# Patient Record
Sex: FEMALE | Race: WHITE | NOT HISPANIC OR LATINO
[De-identification: names, ages, dates, MRNs, and addresses within clinical notes are randomized per-mention and may not be internally consistent; named-entity substitution may affect disease eponyms.]

---

## 2019-12-11 PROBLEM — Z00.00 ENCOUNTER FOR PREVENTIVE HEALTH EXAMINATION: Status: ACTIVE | Noted: 2019-12-11

## 2020-01-21 ENCOUNTER — APPOINTMENT (OUTPATIENT)
Dept: ORTHOPEDIC SURGERY | Facility: CLINIC | Age: 75
End: 2020-01-21
Payer: MEDICARE

## 2020-01-21 VITALS
BODY MASS INDEX: 39.56 KG/M2 | SYSTOLIC BLOOD PRESSURE: 131 MMHG | HEART RATE: 76 BPM | HEIGHT: 62 IN | DIASTOLIC BLOOD PRESSURE: 87 MMHG | WEIGHT: 215 LBS

## 2020-01-21 DIAGNOSIS — M17.12 UNILATERAL PRIMARY OSTEOARTHRITIS, LEFT KNEE: ICD-10-CM

## 2020-01-21 DIAGNOSIS — Z86.39 PERSONAL HISTORY OF OTHER ENDOCRINE, NUTRITIONAL AND METABOLIC DISEASE: ICD-10-CM

## 2020-01-21 DIAGNOSIS — Z78.9 OTHER SPECIFIED HEALTH STATUS: ICD-10-CM

## 2020-01-21 PROCEDURE — 72170 X-RAY EXAM OF PELVIS: CPT | Mod: 59

## 2020-01-21 PROCEDURE — 73562 X-RAY EXAM OF KNEE 3: CPT | Mod: LT

## 2020-01-21 PROCEDURE — 99202 OFFICE O/P NEW SF 15 MIN: CPT

## 2020-01-21 RX ORDER — IBUPROFEN 200 MG/1
TABLET, COATED ORAL
Refills: 0 | Status: ACTIVE | COMMUNITY

## 2020-02-04 ENCOUNTER — OUTPATIENT (OUTPATIENT)
Dept: OUTPATIENT SERVICES | Facility: HOSPITAL | Age: 75
LOS: 1 days | End: 2020-02-04
Payer: MEDICARE

## 2020-02-04 VITALS
SYSTOLIC BLOOD PRESSURE: 136 MMHG | RESPIRATION RATE: 15 BRPM | HEIGHT: 59 IN | HEART RATE: 89 BPM | DIASTOLIC BLOOD PRESSURE: 80 MMHG | TEMPERATURE: 98 F | OXYGEN SATURATION: 95 % | WEIGHT: 209.44 LBS

## 2020-02-04 DIAGNOSIS — M50.20 OTHER CERVICAL DISC DISPLACEMENT, UNSPECIFIED CERVICAL REGION: ICD-10-CM

## 2020-02-04 DIAGNOSIS — I89.0 LYMPHEDEMA, NOT ELSEWHERE CLASSIFIED: ICD-10-CM

## 2020-02-04 DIAGNOSIS — M17.12 UNILATERAL PRIMARY OSTEOARTHRITIS, LEFT KNEE: ICD-10-CM

## 2020-02-04 DIAGNOSIS — Z90.49 ACQUIRED ABSENCE OF OTHER SPECIFIED PARTS OF DIGESTIVE TRACT: Chronic | ICD-10-CM

## 2020-02-04 DIAGNOSIS — E66.9 OBESITY, UNSPECIFIED: ICD-10-CM

## 2020-02-04 DIAGNOSIS — Z90.710 ACQUIRED ABSENCE OF BOTH CERVIX AND UTERUS: Chronic | ICD-10-CM

## 2020-02-04 DIAGNOSIS — Z98.890 OTHER SPECIFIED POSTPROCEDURAL STATES: Chronic | ICD-10-CM

## 2020-02-04 DIAGNOSIS — M25.562 PAIN IN LEFT KNEE: ICD-10-CM

## 2020-02-04 DIAGNOSIS — Z01.818 ENCOUNTER FOR OTHER PREPROCEDURAL EXAMINATION: ICD-10-CM

## 2020-02-04 LAB
ALBUMIN SERPL ELPH-MCNC: 3.9 G/DL — SIGNIFICANT CHANGE UP (ref 3.3–5)
ALP SERPL-CCNC: 137 U/L — HIGH (ref 30–120)
ALT FLD-CCNC: 21 U/L DA — SIGNIFICANT CHANGE UP (ref 10–60)
ANION GAP SERPL CALC-SCNC: 11 MMOL/L — SIGNIFICANT CHANGE UP (ref 5–17)
APTT BLD: 32.1 SEC — SIGNIFICANT CHANGE UP (ref 28.5–37)
AST SERPL-CCNC: 18 U/L — SIGNIFICANT CHANGE UP (ref 10–40)
BILIRUB SERPL-MCNC: 0.4 MG/DL — SIGNIFICANT CHANGE UP (ref 0.2–1.2)
BUN SERPL-MCNC: 17 MG/DL — SIGNIFICANT CHANGE UP (ref 7–23)
CALCIUM SERPL-MCNC: 9.1 MG/DL — SIGNIFICANT CHANGE UP (ref 8.4–10.5)
CHLORIDE SERPL-SCNC: 104 MMOL/L — SIGNIFICANT CHANGE UP (ref 96–108)
CO2 SERPL-SCNC: 26 MMOL/L — SIGNIFICANT CHANGE UP (ref 22–31)
CREAT SERPL-MCNC: 0.76 MG/DL — SIGNIFICANT CHANGE UP (ref 0.5–1.3)
GLUCOSE SERPL-MCNC: 148 MG/DL — HIGH (ref 70–99)
HCT VFR BLD CALC: 43.9 % — SIGNIFICANT CHANGE UP (ref 34.5–45)
HGB BLD-MCNC: 14.1 G/DL — SIGNIFICANT CHANGE UP (ref 11.5–15.5)
INR BLD: 0.99 RATIO — SIGNIFICANT CHANGE UP (ref 0.88–1.16)
MCHC RBC-ENTMCNC: 27.1 PG — SIGNIFICANT CHANGE UP (ref 27–34)
MCHC RBC-ENTMCNC: 32.1 GM/DL — SIGNIFICANT CHANGE UP (ref 32–36)
MCV RBC AUTO: 84.4 FL — SIGNIFICANT CHANGE UP (ref 80–100)
NRBC # BLD: 0 /100 WBCS — SIGNIFICANT CHANGE UP (ref 0–0)
PLATELET # BLD AUTO: 319 K/UL — SIGNIFICANT CHANGE UP (ref 150–400)
POTASSIUM SERPL-MCNC: 3.8 MMOL/L — SIGNIFICANT CHANGE UP (ref 3.5–5.3)
POTASSIUM SERPL-SCNC: 3.8 MMOL/L — SIGNIFICANT CHANGE UP (ref 3.5–5.3)
PROT SERPL-MCNC: 8.5 G/DL — HIGH (ref 6–8.3)
PROTHROM AB SERPL-ACNC: 11 SEC — SIGNIFICANT CHANGE UP (ref 10–12.9)
RBC # BLD: 5.2 M/UL — SIGNIFICANT CHANGE UP (ref 3.8–5.2)
RBC # FLD: 12.8 % — SIGNIFICANT CHANGE UP (ref 10.3–14.5)
SODIUM SERPL-SCNC: 141 MMOL/L — SIGNIFICANT CHANGE UP (ref 135–145)
WBC # BLD: 8.74 K/UL — SIGNIFICANT CHANGE UP (ref 3.8–10.5)
WBC # FLD AUTO: 8.74 K/UL — SIGNIFICANT CHANGE UP (ref 3.8–10.5)

## 2020-02-04 PROCEDURE — G0463: CPT

## 2020-02-04 NOTE — H&P PST ADULT - NSICDXPROBLEM_GEN_ALL_CORE_FT
PROBLEM DIAGNOSES  Problem: Lymphedema  Assessment and Plan: to see vascular PMD for clearance    Problem: Obesity  Assessment and Plan: cardio and medical clearance    Problem: Bulging of cervical intervertebral disc  Assessment and Plan: anesthesia to be aware    Problem: Left knee pain  Assessment and Plan: left total knee replacement; preop instructions given; to have medical, cardiac and vascular clearance

## 2020-02-04 NOTE — H&P PST ADULT - NSICDXPASTMEDICALHX_GEN_ALL_CORE_FT
PAST MEDICAL HISTORY:  Bilateral knee pain osteoarthritis    Bulging of cervical intervertebral disc     GERD (gastroesophageal reflux disease)     Hyperlipidemia     Low back pain     Lymphedema of leg legs    Obesity

## 2020-02-04 NOTE — H&P PST ADULT - HISTORY OF PRESENT ILLNESS
this is a 73 y/o female who has had left knee pain and trouble walking for 1.5 yrs;  tests show OA and bone on bone; to have left total knee replacement; takes advil for pain

## 2020-02-04 NOTE — H&P PST ADULT - NEGATIVE IMMUNOLOGICAL SYMPTOMS
How Severe Is This Condition?: mild
Additional History: Patient states that she has been having some breakouts on the jawline area. She is not currently using anything to treat it.
no recurring infections/no recurring pneumonia/no persistent infections

## 2020-02-04 NOTE — H&P PST ADULT - NSANTHOSAYNRD_GEN_A_CORE
No. LM screening performed.  STOP BANG Legend: 0-2 = LOW Risk; 3-4 = INTERMEDIATE Risk; 5-8 = HIGH Risk

## 2020-02-05 LAB
HBA1C BLD-MCNC: 6.3 % — HIGH (ref 4–5.6)
MRSA PCR RESULT.: SIGNIFICANT CHANGE UP
S AUREUS DNA NOSE QL NAA+PROBE: SIGNIFICANT CHANGE UP

## 2020-02-13 PROBLEM — E66.9 OBESITY, UNSPECIFIED: Chronic | Status: ACTIVE | Noted: 2020-02-04

## 2020-02-13 PROBLEM — M54.5 LOW BACK PAIN: Chronic | Status: ACTIVE | Noted: 2020-02-04

## 2020-02-13 PROBLEM — M50.20 OTHER CERVICAL DISC DISPLACEMENT, UNSPECIFIED CERVICAL REGION: Chronic | Status: ACTIVE | Noted: 2020-02-04

## 2020-02-13 PROBLEM — E78.5 HYPERLIPIDEMIA, UNSPECIFIED: Chronic | Status: ACTIVE | Noted: 2020-02-04

## 2020-02-13 PROBLEM — I89.0 LYMPHEDEMA, NOT ELSEWHERE CLASSIFIED: Chronic | Status: ACTIVE | Noted: 2020-02-04

## 2020-02-13 PROBLEM — K21.9 GASTRO-ESOPHAGEAL REFLUX DISEASE WITHOUT ESOPHAGITIS: Chronic | Status: ACTIVE | Noted: 2020-02-04

## 2020-02-13 PROBLEM — M25.561 PAIN IN RIGHT KNEE: Chronic | Status: ACTIVE | Noted: 2020-02-04

## 2020-02-19 ENCOUNTER — APPOINTMENT (OUTPATIENT)
Dept: ORTHOPEDIC SURGERY | Facility: HOSPITAL | Age: 75
End: 2020-02-19

## 2020-02-19 ENCOUNTER — INPATIENT (INPATIENT)
Facility: HOSPITAL | Age: 75
LOS: 4 days | Discharge: HOME | DRG: 470 | End: 2020-02-24
Attending: ORTHOPAEDIC SURGERY | Admitting: ORTHOPAEDIC SURGERY
Payer: MEDICARE

## 2020-02-19 ENCOUNTER — RESULT REVIEW (OUTPATIENT)
Age: 75
End: 2020-02-19

## 2020-02-19 ENCOUNTER — TRANSCRIPTION ENCOUNTER (OUTPATIENT)
Age: 75
End: 2020-02-19

## 2020-02-19 VITALS
WEIGHT: 210.76 LBS | HEART RATE: 84 BPM | SYSTOLIC BLOOD PRESSURE: 146 MMHG | HEIGHT: 62 IN | RESPIRATION RATE: 12 BRPM | TEMPERATURE: 98 F | DIASTOLIC BLOOD PRESSURE: 90 MMHG | OXYGEN SATURATION: 98 %

## 2020-02-19 DIAGNOSIS — Z90.710 ACQUIRED ABSENCE OF BOTH CERVIX AND UTERUS: Chronic | ICD-10-CM

## 2020-02-19 DIAGNOSIS — M17.12 UNILATERAL PRIMARY OSTEOARTHRITIS, LEFT KNEE: ICD-10-CM

## 2020-02-19 DIAGNOSIS — Z98.890 OTHER SPECIFIED POSTPROCEDURAL STATES: Chronic | ICD-10-CM

## 2020-02-19 DIAGNOSIS — Z90.49 ACQUIRED ABSENCE OF OTHER SPECIFIED PARTS OF DIGESTIVE TRACT: Chronic | ICD-10-CM

## 2020-02-19 LAB
ANION GAP SERPL CALC-SCNC: 9 MMOL/L — SIGNIFICANT CHANGE UP (ref 5–17)
BUN SERPL-MCNC: 15 MG/DL — SIGNIFICANT CHANGE UP (ref 7–23)
CALCIUM SERPL-MCNC: 8.9 MG/DL — SIGNIFICANT CHANGE UP (ref 8.4–10.5)
CHLORIDE SERPL-SCNC: 102 MMOL/L — SIGNIFICANT CHANGE UP (ref 96–108)
CO2 SERPL-SCNC: 26 MMOL/L — SIGNIFICANT CHANGE UP (ref 22–31)
CREAT SERPL-MCNC: 0.97 MG/DL — SIGNIFICANT CHANGE UP (ref 0.5–1.3)
GLUCOSE SERPL-MCNC: 292 MG/DL — HIGH (ref 70–99)
HCT VFR BLD CALC: 39.2 % — SIGNIFICANT CHANGE UP (ref 34.5–45)
HGB BLD-MCNC: 12.9 G/DL — SIGNIFICANT CHANGE UP (ref 11.5–15.5)
POTASSIUM SERPL-MCNC: 4.3 MMOL/L — SIGNIFICANT CHANGE UP (ref 3.5–5.3)
POTASSIUM SERPL-SCNC: 4.3 MMOL/L — SIGNIFICANT CHANGE UP (ref 3.5–5.3)
SODIUM SERPL-SCNC: 137 MMOL/L — SIGNIFICANT CHANGE UP (ref 135–145)

## 2020-02-19 PROCEDURE — 27447 TOTAL KNEE ARTHROPLASTY: CPT | Mod: 82,LT

## 2020-02-19 PROCEDURE — 88311 DECALCIFY TISSUE: CPT | Mod: 26

## 2020-02-19 PROCEDURE — 73562 X-RAY EXAM OF KNEE 3: CPT | Mod: 26,LT

## 2020-02-19 PROCEDURE — 99222 1ST HOSP IP/OBS MODERATE 55: CPT

## 2020-02-19 PROCEDURE — 27447 TOTAL KNEE ARTHROPLASTY: CPT | Mod: LT

## 2020-02-19 PROCEDURE — 88305 TISSUE EXAM BY PATHOLOGIST: CPT | Mod: 26

## 2020-02-19 RX ORDER — OXYCODONE HYDROCHLORIDE 5 MG/1
5 TABLET ORAL ONCE
Refills: 0 | Status: DISCONTINUED | OUTPATIENT
Start: 2020-02-19 | End: 2020-02-19

## 2020-02-19 RX ORDER — ASPIRIN/CALCIUM CARB/MAGNESIUM 324 MG
1 TABLET ORAL
Qty: 82 | Refills: 0
Start: 2020-02-19 | End: 2020-03-30

## 2020-02-19 RX ORDER — SENNA PLUS 8.6 MG/1
2 TABLET ORAL
Qty: 0 | Refills: 0 | DISCHARGE
Start: 2020-02-19

## 2020-02-19 RX ORDER — CEFAZOLIN SODIUM 1 G
2000 VIAL (EA) INJECTION EVERY 8 HOURS
Refills: 0 | Status: COMPLETED | OUTPATIENT
Start: 2020-02-19 | End: 2020-02-19

## 2020-02-19 RX ORDER — OXYCODONE HYDROCHLORIDE 5 MG/1
5 TABLET ORAL
Refills: 0 | Status: DISCONTINUED | OUTPATIENT
Start: 2020-02-19 | End: 2020-02-20

## 2020-02-19 RX ORDER — MAGNESIUM HYDROXIDE 400 MG/1
30 TABLET, CHEWABLE ORAL DAILY
Refills: 0 | Status: DISCONTINUED | OUTPATIENT
Start: 2020-02-19 | End: 2020-02-24

## 2020-02-19 RX ORDER — HYDROMORPHONE HYDROCHLORIDE 2 MG/ML
0.5 INJECTION INTRAMUSCULAR; INTRAVENOUS; SUBCUTANEOUS
Refills: 0 | Status: DISCONTINUED | OUTPATIENT
Start: 2020-02-19 | End: 2020-02-19

## 2020-02-19 RX ORDER — OXYCODONE HYDROCHLORIDE 5 MG/1
10 TABLET ORAL
Refills: 0 | Status: DISCONTINUED | OUTPATIENT
Start: 2020-02-19 | End: 2020-02-20

## 2020-02-19 RX ORDER — CHLORHEXIDINE GLUCONATE 213 G/1000ML
1 SOLUTION TOPICAL ONCE
Refills: 0 | Status: COMPLETED | OUTPATIENT
Start: 2020-02-19 | End: 2020-02-19

## 2020-02-19 RX ORDER — CEFAZOLIN SODIUM 1 G
2000 VIAL (EA) INJECTION ONCE
Refills: 0 | Status: COMPLETED | OUTPATIENT
Start: 2020-02-19 | End: 2020-02-19

## 2020-02-19 RX ORDER — ACETAMINOPHEN 500 MG
2 TABLET ORAL
Qty: 0 | Refills: 0 | DISCHARGE
Start: 2020-02-19 | End: 2020-03-04

## 2020-02-19 RX ORDER — POLYETHYLENE GLYCOL 3350 17 G/17G
17 POWDER, FOR SOLUTION ORAL
Qty: 0 | Refills: 0 | DISCHARGE
Start: 2020-02-19

## 2020-02-19 RX ORDER — SENNA PLUS 8.6 MG/1
2 TABLET ORAL AT BEDTIME
Refills: 0 | Status: DISCONTINUED | OUTPATIENT
Start: 2020-02-19 | End: 2020-02-24

## 2020-02-19 RX ORDER — HYDROMORPHONE HYDROCHLORIDE 2 MG/ML
0.5 INJECTION INTRAMUSCULAR; INTRAVENOUS; SUBCUTANEOUS
Refills: 0 | Status: DISCONTINUED | OUTPATIENT
Start: 2020-02-19 | End: 2020-02-24

## 2020-02-19 RX ORDER — ASPIRIN/CALCIUM CARB/MAGNESIUM 324 MG
81 TABLET ORAL EVERY 12 HOURS
Refills: 0 | Status: DISCONTINUED | OUTPATIENT
Start: 2020-02-20 | End: 2020-02-24

## 2020-02-19 RX ORDER — ACETAMINOPHEN 500 MG
1000 TABLET ORAL EVERY 8 HOURS
Refills: 0 | Status: DISCONTINUED | OUTPATIENT
Start: 2020-02-20 | End: 2020-02-24

## 2020-02-19 RX ORDER — PANTOPRAZOLE SODIUM 20 MG/1
1 TABLET, DELAYED RELEASE ORAL
Qty: 30 | Refills: 1
Start: 2020-02-19 | End: 2020-04-18

## 2020-02-19 RX ORDER — ACETAMINOPHEN 500 MG
1000 TABLET ORAL ONCE
Refills: 0 | Status: COMPLETED | OUTPATIENT
Start: 2020-02-19 | End: 2020-02-19

## 2020-02-19 RX ORDER — ONDANSETRON 8 MG/1
4 TABLET, FILM COATED ORAL EVERY 6 HOURS
Refills: 0 | Status: DISCONTINUED | OUTPATIENT
Start: 2020-02-19 | End: 2020-02-24

## 2020-02-19 RX ORDER — INFLUENZA VIRUS VACCINE 15; 15; 15; 15 UG/.5ML; UG/.5ML; UG/.5ML; UG/.5ML
0.5 SUSPENSION INTRAMUSCULAR ONCE
Refills: 0 | Status: COMPLETED | OUTPATIENT
Start: 2020-02-19 | End: 2020-02-19

## 2020-02-19 RX ORDER — PANTOPRAZOLE SODIUM 20 MG/1
40 TABLET, DELAYED RELEASE ORAL
Refills: 0 | Status: DISCONTINUED | OUTPATIENT
Start: 2020-02-20 | End: 2020-02-24

## 2020-02-19 RX ORDER — POLYETHYLENE GLYCOL 3350 17 G/17G
17 POWDER, FOR SOLUTION ORAL AT BEDTIME
Refills: 0 | Status: DISCONTINUED | OUTPATIENT
Start: 2020-02-19 | End: 2020-02-24

## 2020-02-19 RX ORDER — TRANEXAMIC ACID 100 MG/ML
1000 INJECTION, SOLUTION INTRAVENOUS ONCE
Refills: 0 | Status: COMPLETED | OUTPATIENT
Start: 2020-02-19 | End: 2020-02-19

## 2020-02-19 RX ORDER — IBUPROFEN 200 MG
1 TABLET ORAL
Qty: 0 | Refills: 0 | DISCHARGE

## 2020-02-19 RX ORDER — ACETAMINOPHEN 500 MG
1000 TABLET ORAL EVERY 6 HOURS
Refills: 0 | Status: COMPLETED | OUTPATIENT
Start: 2020-02-19 | End: 2020-02-20

## 2020-02-19 RX ORDER — CELECOXIB 200 MG/1
1 CAPSULE ORAL
Qty: 60 | Refills: 0
Start: 2020-02-19 | End: 2020-03-19

## 2020-02-19 RX ORDER — ONDANSETRON 8 MG/1
4 TABLET, FILM COATED ORAL ONCE
Refills: 0 | Status: DISCONTINUED | OUTPATIENT
Start: 2020-02-19 | End: 2020-02-19

## 2020-02-19 RX ORDER — CELECOXIB 200 MG/1
200 CAPSULE ORAL EVERY 12 HOURS
Refills: 0 | Status: DISCONTINUED | OUTPATIENT
Start: 2020-02-19 | End: 2020-02-24

## 2020-02-19 RX ORDER — SODIUM CHLORIDE 9 MG/ML
1000 INJECTION, SOLUTION INTRAVENOUS
Refills: 0 | Status: DISCONTINUED | OUTPATIENT
Start: 2020-02-19 | End: 2020-02-19

## 2020-02-19 RX ORDER — SODIUM CHLORIDE 9 MG/ML
1000 INJECTION, SOLUTION INTRAVENOUS
Refills: 0 | Status: DISCONTINUED | OUTPATIENT
Start: 2020-02-19 | End: 2020-02-21

## 2020-02-19 RX ORDER — HYDROMORPHONE HYDROCHLORIDE 2 MG/ML
0.5 INJECTION INTRAMUSCULAR; INTRAVENOUS; SUBCUTANEOUS ONCE
Refills: 0 | Status: DISCONTINUED | OUTPATIENT
Start: 2020-02-19 | End: 2020-02-19

## 2020-02-19 RX ADMIN — Medication 100 MILLIGRAM(S): at 22:59

## 2020-02-19 RX ADMIN — SODIUM CHLORIDE 100 MILLILITER(S): 9 INJECTION, SOLUTION INTRAVENOUS at 20:29

## 2020-02-19 RX ADMIN — HYDROMORPHONE HYDROCHLORIDE 0.5 MILLIGRAM(S): 2 INJECTION INTRAMUSCULAR; INTRAVENOUS; SUBCUTANEOUS at 23:51

## 2020-02-19 RX ADMIN — SODIUM CHLORIDE 50 MILLILITER(S): 9 INJECTION, SOLUTION INTRAVENOUS at 10:26

## 2020-02-19 RX ADMIN — HYDROMORPHONE HYDROCHLORIDE 0.5 MILLIGRAM(S): 2 INJECTION INTRAMUSCULAR; INTRAVENOUS; SUBCUTANEOUS at 23:14

## 2020-02-19 RX ADMIN — OXYCODONE HYDROCHLORIDE 5 MILLIGRAM(S): 5 TABLET ORAL at 14:09

## 2020-02-19 RX ADMIN — Medication 400 MILLIGRAM(S): at 20:30

## 2020-02-19 RX ADMIN — Medication 1000 MILLIGRAM(S): at 14:30

## 2020-02-19 RX ADMIN — CELECOXIB 200 MILLIGRAM(S): 200 CAPSULE ORAL at 21:30

## 2020-02-19 RX ADMIN — HYDROMORPHONE HYDROCHLORIDE 0.5 MILLIGRAM(S): 2 INJECTION INTRAMUSCULAR; INTRAVENOUS; SUBCUTANEOUS at 22:59

## 2020-02-19 RX ADMIN — HYDROMORPHONE HYDROCHLORIDE 0.5 MILLIGRAM(S): 2 INJECTION INTRAMUSCULAR; INTRAVENOUS; SUBCUTANEOUS at 12:40

## 2020-02-19 RX ADMIN — SODIUM CHLORIDE 100 MILLILITER(S): 9 INJECTION, SOLUTION INTRAVENOUS at 12:13

## 2020-02-19 RX ADMIN — OXYCODONE HYDROCHLORIDE 5 MILLIGRAM(S): 5 TABLET ORAL at 15:00

## 2020-02-19 RX ADMIN — HYDROMORPHONE HYDROCHLORIDE 0.5 MILLIGRAM(S): 2 INJECTION INTRAMUSCULAR; INTRAVENOUS; SUBCUTANEOUS at 12:13

## 2020-02-19 RX ADMIN — CELECOXIB 200 MILLIGRAM(S): 200 CAPSULE ORAL at 21:11

## 2020-02-19 RX ADMIN — Medication 400 MILLIGRAM(S): at 14:03

## 2020-02-19 RX ADMIN — Medication 100 MILLIGRAM(S): at 15:39

## 2020-02-19 RX ADMIN — Medication 1000 MILLIGRAM(S): at 21:00

## 2020-02-19 NOTE — CONSULT NOTE ADULT - ASSESSMENT
Pt is a 75 yo F presenting to [facility] ED for elective L TKR. PMH Osteoarthritis, Pre-Diabetes.     Osteoarthritis: s/p L TKA POD #0  -pain control as ordered  -Caprini Score 9 points.   -ASA as ordered (previously tolerated as per patient)  -Post Op X Ray shows Postoperative AP and lateral views show a knee replacement with good alignment.    Pre-Diabetes: A1c 6.3%.   -monitor blood glucose.     DVT ppx: ASA as ordered.

## 2020-02-19 NOTE — PHYSICAL THERAPY INITIAL EVALUATION ADULT - GENERAL OBSERVATIONS, REHAB EVAL
Pt received supine in bed. All lines intact. Pt agreeable to physical therapy. + bandage left LE C/D/I + mallory drain + IV +SCD + supplemental 02

## 2020-02-19 NOTE — PROGRESS NOTE ADULT - SUBJECTIVE AND OBJECTIVE BOX
Ortho Post Op Check    Procedure: left TKA  Surgeon: Andrei    Pt c/o a lot of pain.  Just got a dose of IV dialudid.  Denies CP, SOB, N/V, numbness/tingling .  Has voided, eating.  Pain Rx:  acetaminophen  IVPB .. 1000 milliGRAM(s) IV Intermittent every 6 hours  celecoxib 200 milliGRAM(s) Oral every 12 hours  HYDROmorphone  Injectable 0.5 milliGRAM(s) IV Push every 3 hours PRN  ondansetron Injectable 4 milliGRAM(s) IV Push every 6 hours PRN  oxyCODONE    IR 5 milliGRAM(s) Oral every 3 hours PRN  oxyCODONE    IR 10 milliGRAM(s) Oral every 3 hours PRN      General Exam:  Vital Signs Last 24 Hrs  T(C): 36.1 (02-19-20 @ 11:44), Max: 36.4 (02-19-20 @ 09:59)  T(F): 97 (02-19-20 @ 11:44), Max: 97.5 (02-19-20 @ 09:59)  HR: 66 (02-19-20 @ 11:44) (65 - 73)  BP: 112/61 (02-19-20 @ 11:44) (91/54 - 112/61)  BP(mean): --  RR: 16 (02-19-20 @ 11:44) (12 - 62)  SpO2: 99% (02-19-20 @ 11:44) (95% - 99%)    General: Pt Alert and oriented, NAD,   Heart: RRR no murmur  Lungs:clear  Abdomen: BS+.    Ext:  Left Knee Dressing clean, dry, & intact.  Johnson under ace.  Walked 10' w/ PT  Exercises reviewed and performed by pt  Neuro/Vasc: Feet toes warm, pink. DP = 2+ No calf tenderness bilat.. Has sensation over feet & toes bilat. Full AROM bilat feet & toes. EHL = 5/5 + lymhedema left foot  VTEP: On Venodynes Bilat + BID Ecotrin       A/P: 74yFemale POD#0 s/p left TKA  - Stable  - Pain Control  - DVT ppx: ecotrin  - Post op abx:ancef  - Weight bearing status: wbat  -d/c plan-- home w/ services

## 2020-02-19 NOTE — DISCHARGE NOTE PROVIDER - NSDCFUSCHEDAPPT_GEN_ALL_CORE_FT
IZABELA CLARK ; 02/19/2020 ; [facility] [address] [facility] [address]  IZABELA CLARK ; 03/09/2020 ; NP OrthoSurg [address] [facility]  IZABELA CLARK ; 04/21/2020 ; [facility] [address] [facility] IZABELA CLARK ; 03/09/2020 ; [facility]  IZABELA CLARK ; 04/21/2020 ; [facility] [address] [place] [address] IZABELA CLARK ; 03/09/2020 ; hospitals [facility]  IZABELA CLARK ; 04/21/2020 ; [facility] [address] [place] [address] IZABELA CLARK ; 03/09/2020 ; [facility]  IZABELA CLARK ; 04/21/2020 ; [facility] [address] [place] [address]

## 2020-02-19 NOTE — DISCHARGE NOTE PROVIDER - NSDCACTIVITY_GEN_ALL_CORE
Showering allowed/Walking - Outdoors allowed/No heavy lifting/straining/Do not drive or operate machinery/Walking - Indoors allowed/Stairs allowed

## 2020-02-19 NOTE — DISCHARGE NOTE NURSING/CASE MANAGEMENT/SOCIAL WORK - NSDCCRNAME_GEN_ALL_CORE_FT
[facility] ([facility])  [address] [place] [address]

## 2020-02-19 NOTE — DISCHARGE NOTE NURSING/CASE MANAGEMENT/SOCIAL WORK - NSSCTYPOFSERV_GEN_ALL_CORE
VN/PT services Registered Nurse to visit the day after hospital discharge; Physical Therapist to follow. Please contact the home care agency at the above phone number if you have not heard from them by 12 noon on the day after your hospital discharge.

## 2020-02-19 NOTE — OCCUPATIONAL THERAPY INITIAL EVALUATION ADULT - ADDITIONAL COMMENTS
Lives with her children who work during the day.  2 steps to enter no handrail . No steps inside. Stall shower. Has a RW, cane and SC

## 2020-02-19 NOTE — DISCHARGE NOTE PROVIDER - NSDCCPCAREPLAN_GEN_ALL_CORE_FT
PRINCIPAL DISCHARGE DIAGNOSIS  Diagnosis: Left knee DJD  Assessment and Plan of Treatment: Your new total knee requires proper care.  Your care provider is your best resource for care information.  Please follow these basic guidelines.  Use pain medication if needed as prescribed.  Ice packs are a helpful addition to your comfort.  Applying a  waterproof ice pack over a cloth or thin towel to the site for 30 minutes per hour may provide benefit by reducing swelling and discomfort.  Your Physical Therapy/Occupational Therapy may include ambulation, transfers, stairs, ADL's (activities of daily living), range of motion exercises, and isometrics.  Your participation is vital to your recovery.  -Your Activity  • Weight Bearing as tolerated with rolling walker.  • Take short, frequent walks increasing the distance that you walk each day as tolerated.  • Change your position every hour to decrease pain and stiffness.  • Continue the exercises taught to you by your physical therapist.  • No driving until cleared by the doctor.  • No tub baths, hot tubs, or swimming pools until instructed by your doctor.  • Do not squat down on the floor.  • Do not kneel or twist your knee.  Keep left knee incision dry and intact (HOPE dressing) for up to 7 days. May remove earlier if battery alarms and wrap knee wound with dry sterile dressing if needed.  You have sutures (stiches) and may shower if HOPE intact or if no drainage noted from sutured incision, allowing water to rinse the incision and pat it dry afterward with a clean towel.  If you have further questions or problems call your doctor's office.

## 2020-02-19 NOTE — BRIEF OPERATIVE NOTE - COMMENTS
Tourniquet at 250mm Hg  Closed with nylon and HOPE Tourniquet at 250mm Hg for 65 minutes  Closed with nylon and covered with HOPE

## 2020-02-19 NOTE — DISCHARGE NOTE PROVIDER - INSTRUCTIONS
Regular diet.  For Constipation :   • Increase your daily water intake.   • Try adding fiber to your diet by eating fruits, vegetables and foods that are rich in grains.  • If you do experience constipation, you may take an over-the-counter stool softener/laxative such as Colace, Senokot , Milk of Magnesia or Miralax.

## 2020-02-19 NOTE — DISCHARGE NOTE NURSING/CASE MANAGEMENT/SOCIAL WORK - NSSCNAMETXT_GEN_ALL_CORE
[facility] -Nurse to visit the day after hospital discharge; physical therapist to follow. Please contact the home care agency if you have not heard from them by 12 noon on the day after your hospital discharge. [facility] (formerly [facility])   [address] [place] [address]

## 2020-02-19 NOTE — CONSULT NOTE ADULT - SUBJECTIVE AND OBJECTIVE BOX
Pt is a 75 yo F presenting to [facility] ED for elective L TKR. PMH Osteoarthritis, borderline DM as reported by patient.   Patient seen and examined postoperatively. She complains of 10/10 L knee pain, sharp i nature, worse with movement and constant, no radiation elsewhere. Has no other complaints. Denies headaches, nausea, vomiting, chest pain, SOB, palpitations, abdominal pain, constipation, diarrhea, melena, hematochezia, dysuria.     Allergies: Erythromycin and Lipitor (intolerance), Anacin intolerance  Past surgical hx: cholecystectomy (in 1970's)  Social hx: Denies use of etoh, tobacco and drug use.   Family hx: denies family hx of CVA in parents    T(C): 36.1 (02-19-20 @ 11:44), Max: 36.7 (02-19-20 @ 06:51)  HR: 66 (02-19-20 @ 11:44) (65 - 84)  BP: 112/61 (02-19-20 @ 11:44) (91/54 - 166/90)  RR: 16 (02-19-20 @ 11:44) (12 - 62)  SpO2: 99% (02-19-20 @ 11:44) (95% - 100%)  Wt(kg): --    Physical Exam:   GENERAL: well-groomed, well-developed, NAD  HEENT: head NC/AT; EOM intact, conjunctiva & sclera clear; hearing grossly intact, moist mucous membranes  NECK: supple, no JVD  RESPIRATORY: CTA B/L, no wheezing, rales, rhonchi or rubs  CARDIOVASCULAR: S1&S2, RRR, no murmurs or gallops  ABDOMEN: soft, non-tender, non-distended, + Bowel sounds x4 quadrants, no guarding, rebound or rigidity  MUSCULOSKELETAL:  no clubbing, cyanosis or edema of all 4 extremities  LYMPH: no cervical lymphadenopathy  VASCULAR: Radial pulses 2+ bilaterally, no varicose veins   SKIN: warm and dry, color normal  NEUROLOGIC: AA&O X3, CN2-12 intact w/ no focal deficits, no sensory loss, motor Strength 5/5 in UE & LE B/L  Psych: Normal mood and affect, normal behavior

## 2020-02-19 NOTE — DISCHARGE NOTE PROVIDER - HOSPITAL COURSE
This 73yo female patient was admitted to [facility] on 2/19/20 with a history of severe degenerative joint disease of the left knee and for elective total joint replacement.  Patient had appropriate preop medical evaluation and testing.    Patient received antibiotics according to SCIP guidelines for infection prevention.  The patient underwent an uncomplicated left total knee replacement by Dr. Ruddy Forbes on 2/19.   Aspirin was given for DVT prophylaxis for Caprini of 9.  Anesthesia, Medical Hospitalist, Physical Therapy and Occupational Therapy were consulted.  Patient is stable for discharge home with a good prognosis on ***.  Appropriate discharge instructions and medications are provided in this document. This 73yo female patient was admitted to [facility] on 2/19/20 with a history of severe degenerative joint disease of the left knee and for elective total joint replacement.  Patient had appropriate preop medical evaluation and testing.    Patient received antibiotics according to SCIP guidelines for infection prevention.  The patient underwent an uncomplicated left total knee replacement by Dr. Ruddy Forbes on 2/19.   Aspirin was given for DVT prophylaxis for Caprini of 9.  Anesthesia, Medical Hospitalist, Physical Therapy and Occupational Therapy were consulted.  Patient is stable for discharge home with a good prognosis on2/24/20  Appropriate discharge instructions and medications are provided in this document. This 73yo female patient was admitted to [facility] on 2/19/20 with a history of severe degenerative joint disease of the left knee and for elective total joint replacement.  Patient had appropriate preop medical evaluation and testing.    Patient received antibiotics according to SCIP guidelines for infection prevention.  The patient underwent an uncomplicated left total knee replacement by Dr. Ruddy Forbes on 2/19.   Aspirin was given for DVT prophylaxis for Caprini of 9.  Anesthesia, Medical Hospitalist, Physical Therapy and Occupational Therapy were consulted.  Patient is stable for discharge home with a good prognosis on2/24/20  Appropriate discharge instructions and medications are provided in this document.        Addendum Dr. Blancas. Patient feeling well. No complaints. Pain controlled. Voiding/having BM. Ambulating well.        T(C): 36.5 (02-20-20 @ 11:41), Max: 37.3 (02-19-20 @ 23:49)    HR: 76 (02-20-20 @ 11:41) (61 - 79)    BP: 105/62 (02-20-20 @ 11:41) (94/51 - 144/84)    RR: 18 (02-20-20 @ 11:41) (15 - 18)    SpO2: 95% (02-20-20 @ 11:41) (95% - 98%)        GENERAL: NAD, well-groomed, well-developed    HEAD:  Atraumatic, Normocephalic    EYES: EOMI, PERRLA, conjunctiva and sclera clear    ENMT: No tonsillar erythema, exudates, or enlargement; Moist mucous membranes, Good dentition, No lesions    NECK: Supple, No JVD, Normal thyroid    NERVOUS SYSTEM:  Alert & Oriented X3, Good concentration; Motor Strength 5/5 B/L upper and lower extremities; DTRs 2+ intact and symmetric    CHEST/LUNG: Clear to auscultation bilaterally; No rales, rhonchi, wheezing, or rubs    HEART: Regular rate and rhythm; No murmurs, rubs, or gallops    ABDOMEN: Soft, Nontender, Nondistended; Bowel sounds present    EXTREMITIES:  2+ Peripheral Pulses, No clubbing, cyanosis; bilateral lymphedema; s/p left tkr, wound wrapped, mild lower extremity tenderness    LYMPH: No lymphadenopathy noted    SKIN: warm, well perfused        Time spent: 32 min

## 2020-02-19 NOTE — DISCHARGE NOTE NURSING/CASE MANAGEMENT/SOCIAL WORK - PATIENT PORTAL LINK FT
You can access the FollowMyHealth Patient Portal offered by [facility] by registering at the following website: [email or web address]. By joining [employer]’s FollowMyHealth portal, you will also be able to view your health information using other applications (apps) compatible with our system.

## 2020-02-19 NOTE — DISCHARGE NOTE NURSING/CASE MANAGEMENT/SOCIAL WORK - NSSCCARECORD_GEN_ALL_CORE
Home Care Agency/Durable Medical Equipment Agency Home Care Agency/Durable Medical Equipment Agency/Community [facility]

## 2020-02-19 NOTE — DISCHARGE NOTE PROVIDER - NSDCMRMEDTOKEN_GEN_ALL_CORE_FT
acetaminophen 500 mg oral tablet: 2 tab(s) orally every 8 hours  aspirin 81 mg oral delayed release tablet: 1 tab orally every 12 hours. Take 2 hours before Celebrex.   celecoxib 200 mg oral capsule: 1 cap orally every 12 hours. Take 2 hours after Aspirin.  oxyCODONE 5 mg oral tablet: 1 tab(s) orally every 4 hours, As Needed -Mild Pain (1 - 3) MDD:6 2 tabs every 4 hours asneeded for moerate pain  pantoprazole 40 mg oral delayed release tablet: 1 tab orally once a day (before a meal)  polyethylene glycol 3350 oral powder for reconstitution: 17 gram(s) orally once a day (at bedtime)  senna oral tablet: 2 tab(s) orally once a day (at bedtime) acetaminophen 500 mg oral tablet: 2 tab(s) orally every 8 hours  aspirin 81 mg oral delayed release tablet: 1 tab orally every 12 hours. Take 2 hours before Celebrex.   celecoxib 200 mg oral capsule: 1 cap orally every 12 hours. Take 2 hours after Aspirin.  oxyCODONE 5 mg oral tablet: 1 tab(s) orally every 3 hours, As needed, Moderate Pain (4 - 6) to Severe Pain (7-10)  pantoprazole 40 mg oral delayed release tablet: 1 tab orally once a day (before a meal)  polyethylene glycol 3350 oral powder for reconstitution: 17 gram(s) orally once a day (at bedtime)  senna oral tablet: 2 tab(s) orally once a day (at bedtime)

## 2020-02-19 NOTE — PHYSICAL THERAPY INITIAL EVALUATION ADULT - ADDITIONAL COMMENTS
Pt lives in private home with 2 CHELSEA no HR and no steps inside. Pt owns RW (2) and SAC which she used prior to admission .

## 2020-02-20 LAB
ANION GAP SERPL CALC-SCNC: 9 MMOL/L — SIGNIFICANT CHANGE UP (ref 5–17)
BUN SERPL-MCNC: 17 MG/DL — SIGNIFICANT CHANGE UP (ref 7–23)
CALCIUM SERPL-MCNC: 9 MG/DL — SIGNIFICANT CHANGE UP (ref 8.4–10.5)
CHLORIDE SERPL-SCNC: 103 MMOL/L — SIGNIFICANT CHANGE UP (ref 96–108)
CO2 SERPL-SCNC: 24 MMOL/L — SIGNIFICANT CHANGE UP (ref 22–31)
CREAT SERPL-MCNC: 0.83 MG/DL — SIGNIFICANT CHANGE UP (ref 0.5–1.3)
GLUCOSE SERPL-MCNC: 138 MG/DL — HIGH (ref 70–99)
HCT VFR BLD CALC: 36.8 % — SIGNIFICANT CHANGE UP (ref 34.5–45)
HGB BLD-MCNC: 11.8 G/DL — SIGNIFICANT CHANGE UP (ref 11.5–15.5)
MCHC RBC-ENTMCNC: 27.3 PG — SIGNIFICANT CHANGE UP (ref 27–34)
MCHC RBC-ENTMCNC: 32.1 GM/DL — SIGNIFICANT CHANGE UP (ref 32–36)
MCV RBC AUTO: 85 FL — SIGNIFICANT CHANGE UP (ref 80–100)
NRBC # BLD: 0 /100 WBCS — SIGNIFICANT CHANGE UP (ref 0–0)
PLATELET # BLD AUTO: 253 K/UL — SIGNIFICANT CHANGE UP (ref 150–400)
POTASSIUM SERPL-MCNC: 4.6 MMOL/L — SIGNIFICANT CHANGE UP (ref 3.5–5.3)
POTASSIUM SERPL-SCNC: 4.6 MMOL/L — SIGNIFICANT CHANGE UP (ref 3.5–5.3)
RBC # BLD: 4.33 M/UL — SIGNIFICANT CHANGE UP (ref 3.8–5.2)
RBC # FLD: 12.7 % — SIGNIFICANT CHANGE UP (ref 10.3–14.5)
SODIUM SERPL-SCNC: 136 MMOL/L — SIGNIFICANT CHANGE UP (ref 135–145)
WBC # BLD: 12.55 K/UL — HIGH (ref 3.8–10.5)
WBC # FLD AUTO: 12.55 K/UL — HIGH (ref 3.8–10.5)

## 2020-02-20 PROCEDURE — 99233 SBSQ HOSP IP/OBS HIGH 50: CPT

## 2020-02-20 PROCEDURE — 93010 ELECTROCARDIOGRAM REPORT: CPT

## 2020-02-20 RX ORDER — HYDROMORPHONE HYDROCHLORIDE 2 MG/ML
4 INJECTION INTRAMUSCULAR; INTRAVENOUS; SUBCUTANEOUS
Refills: 0 | Status: DISCONTINUED | OUTPATIENT
Start: 2020-02-20 | End: 2020-02-21

## 2020-02-20 RX ORDER — TRAMADOL HYDROCHLORIDE 50 MG/1
50 TABLET ORAL EVERY 4 HOURS
Refills: 0 | Status: DISCONTINUED | OUTPATIENT
Start: 2020-02-20 | End: 2020-02-20

## 2020-02-20 RX ORDER — HYDROMORPHONE HYDROCHLORIDE 2 MG/ML
2 INJECTION INTRAMUSCULAR; INTRAVENOUS; SUBCUTANEOUS
Refills: 0 | Status: DISCONTINUED | OUTPATIENT
Start: 2020-02-20 | End: 2020-02-21

## 2020-02-20 RX ORDER — TRAMADOL HYDROCHLORIDE 50 MG/1
100 TABLET ORAL EVERY 6 HOURS
Refills: 0 | Status: DISCONTINUED | OUTPATIENT
Start: 2020-02-20 | End: 2020-02-20

## 2020-02-20 RX ORDER — OXYCODONE HYDROCHLORIDE 5 MG/1
1 TABLET ORAL
Qty: 42 | Refills: 0
Start: 2020-02-20

## 2020-02-20 RX ADMIN — PANTOPRAZOLE SODIUM 40 MILLIGRAM(S): 20 TABLET, DELAYED RELEASE ORAL at 05:47

## 2020-02-20 RX ADMIN — Medication 81 MILLIGRAM(S): at 17:41

## 2020-02-20 RX ADMIN — TRAMADOL HYDROCHLORIDE 100 MILLIGRAM(S): 50 TABLET ORAL at 12:45

## 2020-02-20 RX ADMIN — CELECOXIB 200 MILLIGRAM(S): 200 CAPSULE ORAL at 21:16

## 2020-02-20 RX ADMIN — CELECOXIB 200 MILLIGRAM(S): 200 CAPSULE ORAL at 09:07

## 2020-02-20 RX ADMIN — Medication 1000 MILLIGRAM(S): at 09:07

## 2020-02-20 RX ADMIN — HYDROMORPHONE HYDROCHLORIDE 2 MILLIGRAM(S): 2 INJECTION INTRAMUSCULAR; INTRAVENOUS; SUBCUTANEOUS at 15:04

## 2020-02-20 RX ADMIN — Medication 1000 MILLIGRAM(S): at 17:41

## 2020-02-20 RX ADMIN — TRAMADOL HYDROCHLORIDE 100 MILLIGRAM(S): 50 TABLET ORAL at 13:30

## 2020-02-20 RX ADMIN — Medication 1000 MILLIGRAM(S): at 02:23

## 2020-02-20 RX ADMIN — Medication 30 MILLILITER(S): at 03:19

## 2020-02-20 RX ADMIN — HYDROMORPHONE HYDROCHLORIDE 2 MILLIGRAM(S): 2 INJECTION INTRAMUSCULAR; INTRAVENOUS; SUBCUTANEOUS at 21:46

## 2020-02-20 RX ADMIN — Medication 81 MILLIGRAM(S): at 05:47

## 2020-02-20 RX ADMIN — Medication 400 MILLIGRAM(S): at 02:11

## 2020-02-20 RX ADMIN — SODIUM CHLORIDE 100 MILLILITER(S): 9 INJECTION, SOLUTION INTRAVENOUS at 05:47

## 2020-02-20 RX ADMIN — HYDROMORPHONE HYDROCHLORIDE 2 MILLIGRAM(S): 2 INJECTION INTRAMUSCULAR; INTRAVENOUS; SUBCUTANEOUS at 14:16

## 2020-02-20 RX ADMIN — HYDROMORPHONE HYDROCHLORIDE 2 MILLIGRAM(S): 2 INJECTION INTRAMUSCULAR; INTRAVENOUS; SUBCUTANEOUS at 21:16

## 2020-02-20 RX ADMIN — CELECOXIB 200 MILLIGRAM(S): 200 CAPSULE ORAL at 22:00

## 2020-02-20 RX ADMIN — HYDROMORPHONE HYDROCHLORIDE 0.5 MILLIGRAM(S): 2 INJECTION INTRAMUSCULAR; INTRAVENOUS; SUBCUTANEOUS at 00:06

## 2020-02-20 NOTE — CHART NOTE - NSCHARTNOTEFT_GEN_A_CORE
Called regarding possible panic attack and midsternal discomfort.  Went to see patient and patient lying in bed.  Patient said that she has a "uncomfortable" feeling in her chest, associated with burning in her mouth.  However the discomfort is already dissipating.  Patient does have a history of GERD.  Patient also has a history of anxiety in her outpatient notes in the chart (though she denies it).  Vital signs were stable at /84, HR 79, RR 16, 97%.  No abnormal cardiac sounds were heard.  Patient's EKG was done that showed NSR with PVCs with RBBB (compared to previous EKG, very similar except new EKG shows a more prominent QRS and T wave).  Patient has had previous adenosine stress test in the past that showed "no angina or ischemic EKG changes, small apical wall ischemia over a small territory defect most c/w small vessel CAD vs breast attenuation, LV hypertrophy with LVEF 64% with no segmental wall motion abnormalities."  Give above, unlikely to be cardiac related and more likely dyspepsia +/- anxiety.  Will try Maalox for symptoms.  However, if symptoms continue to worsen or persist and/or vitals became unstable, will do cardiac workup +/- PE workup.

## 2020-02-20 NOTE — PROVIDER CONTACT NOTE (OTHER) - ASSESSMENT
vital signs BP: 144/84 HR: 79 RR: 16 O2 saturation 97% on RA temp: 98.4. pt c/o mild midsternum pain. pt having difficulty explaining symptoms but states "I just don't feel right." pt states "I feel like I'm having a panic attack." pt denies hx of anxiety, denies hx of panic attacks. pt denies taking any medication for anxiety. denies nausea/vomiting.

## 2020-02-20 NOTE — PROGRESS NOTE ADULT - ASSESSMENT
Pt is a 75 yo F presenting to [place] ED for elective L TKR. PMH Osteoarthritis, Pre-Diabetes.     Osteoarthritis: s/p left TKA POD #1  -multimodal pain control as ordered  -Caprini Score 9 points.   -ASA as ordered for VTE ppx  -wound care per ortho  -monitor hemoglobin and hemodynamics  -monitor hemodynamics  -dispo planning home vs [facility] - unsure  -PT/OT follow up  -with chronic lymphedema --> if left calf is bothering her tomorrow, may need to obtain LE US dopplers tomorrow  -bowel regimen with opioids  -incentive spirometry    Panic attack  -patient with hx of anxiety and GERD  -symptoms completely resolved with MOM  -EKG with PVCs and RBBB (old)    Pre-Diabetes: A1c 6.3%.   -monitor blood glucose.     DVT ppx: ASA as ordered.

## 2020-02-20 NOTE — PROGRESS NOTE ADULT - SUBJECTIVE AND OBJECTIVE BOX
Patient is a 74y old  Female who presents with a chief complaint of s/p Left TKR (2020 08:17)      INTERVAL HPI/OVERNIGHT EVENTS: patient with panic attack overnight, states she thought she was going to die. Had chest pain and anxiety, all symptoms resolved with milk of magnesia and she fell asleep. Now feels well aside from pain in her left knee extending down leg. She states that she has chronic lymphedema and is not progressing well with physical therapy. PT recs rehab to her but she is unsure. No chest pain or shortness of breath. Passing flatus but no BM yet. States that she has a history of anxiety many years ago after her sons , but nothing since. No dizziness or headache. States that she has a history of dizziness for which she required 3 months of vestibular rehab for.    I&O's Summary    2020 07:01  -  2020 07:00  --------------------------------------------------------  IN: 3750 mL / OUT: 750 mL / NET: 3000 mL        LABS:                        11.8   12.55 )-----------( 253      ( 2020 07:10 )             36.8     02-    136  |  103  |  17  ----------------------------<  138<H>  4.6   |  24  |  0.83    Ca    9.0      2020 07:10          CAPILLARY BLOOD GLUCOSE                MEDICATIONS  (STANDING):  acetaminophen   Tablet .. 1000 milliGRAM(s) Oral every 8 hours  aspirin enteric coated 81 milliGRAM(s) Oral every 12 hours  celecoxib 200 milliGRAM(s) Oral every 12 hours  influenza   Vaccine 0.5 milliLiter(s) IntraMuscular once  lactated ringers. 1000 milliLiter(s) (100 mL/Hr) IV Continuous <Continuous>  pantoprazole    Tablet 40 milliGRAM(s) Oral before breakfast  polyethylene glycol 3350 17 Gram(s) Oral at bedtime  senna 2 Tablet(s) Oral at bedtime    MEDICATIONS  (PRN):  aluminum hydroxide/magnesium hydroxide/simethicone Suspension 30 milliLiter(s) Oral four times a day PRN Indigestion  HYDROmorphone   Tablet 2 milliGRAM(s) Oral every 3 hours PRN Mild Pain (1 - 3)  HYDROmorphone   Tablet 4 milliGRAM(s) Oral every 3 hours PRN Moderate Pain (4 - 6)  HYDROmorphone  Injectable 0.5 milliGRAM(s) IV Push every 3 hours PRN Severe Pain (7 - 10)  magnesium hydroxide Suspension 30 milliLiter(s) Oral daily PRN Constipation  ondansetron Injectable 4 milliGRAM(s) IV Push every 6 hours PRN Nausea and/or Vomiting      REVIEW OF SYSTEMS:  CONSTITUTIONAL: No fever, weight loss, or fatigue  EYES: No eye pain, visual disturbances, or discharge  ENMT:  No difficulty hearing, tinnitus, vertigo; No sinus or throat pain  NECK: No pain or stiffness  RESPIRATORY: No cough, wheezing, chills or hemoptysis; No shortness of breath  CARDIOVASCULAR: No chest pain, palpitations, dizziness, or leg swelling  GASTROINTESTINAL: No abdominal or epigastric pain. No nausea, vomiting, or hematemesis; No diarrhea or constipation. No melena or hematochezia.  GENITOURINARY: No dysuria, frequency, hematuria, or incontinence  NEUROLOGICAL: No headaches, memory loss, loss of strength, numbness, or tremors  SKIN: No itching, burning, rashes, or lesions   LYMPH NODES: No enlarged glands  ENDOCRINE: No heat or cold intolerance; No hair loss  MUSCULOSKELETAL: left knee pain  PSYCHIATRIC: No depression, anxiety, mood swings, or difficulty sleeping  HEME/LYMPH: No easy bruising, or bleeding gums  ALLERGY AND IMMUNOLOGIC: No hives or eczema    RADIOLOGY & ADDITIONAL TESTS:    Imaging Personally Reviewed:  [x] YES  [ ] NO    Consultant(s) Notes Reviewed:  [x] YES  [ ] NO    PHYSICAL EXAM:  T(C): 36.5 (20 @ 11:41), Max: 37.3 (20 @ 23:49)  HR: 76 (20 @ 11:41) (61 - 79)  BP: 105/62 (20 @ 11:41) (94/51 - 144/84)  RR: 18 (20 @ 11:41) (15 - 18)  SpO2: 95% (02-20-20 @ 11:41) (95% - 98%)    GENERAL: NAD, well-groomed, well-developed  HEAD:  Atraumatic, Normocephalic  EYES: EOMI, PERRLA, conjunctiva and sclera clear  ENMT: No tonsillar erythema, exudates, or enlargement; Moist mucous membranes, Good dentition, No lesions  NECK: Supple, No JVD, Normal thyroid  NERVOUS SYSTEM:  Alert & Oriented X3, Good concentration; Motor Strength 5/5 B/L upper and lower extremities; DTRs 2+ intact and symmetric  CHEST/LUNG: Clear to auscultation bilaterally; No rales, rhonchi, wheezing, or rubs  HEART: Regular rate and rhythm; No murmurs, rubs, or gallops  ABDOMEN: Soft, Nontender, Nondistended; Bowel sounds present  EXTREMITIES:  2+ Peripheral Pulses, No clubbing, cyanosis; bilateral lymphedema; s/p left tkr, wound wrapped, mild lower extremity tenderness  LYMPH: No lymphadenopathy noted  SKIN: warm, well perfused    Care Discussed with Consultants/Other Providers [x] YES  [ ] NO

## 2020-02-20 NOTE — PROGRESS NOTE ADULT - SUBJECTIVE AND OBJECTIVE BOX
SUBJECTIVE: Patient seen and examined. No nausea/vomiting, nor shortness of breath. Patient last night had an issue with her CP and SOB had an EKG and also MOM for reflux that had resolved immediately after intake of medication. Patient is feeling much better this am.    OBJECTIVE:     Vital Signs Last 24 Hrs  T(C): 36.7 (20 Feb 2020 08:01), Max: 37.3 (19 Feb 2020 23:49)  T(F): 98 (20 Feb 2020 08:01), Max: 99.1 (19 Feb 2020 23:49)  HR: 61 (20 Feb 2020 08:01) (61 - 79)  BP: 94/51 (20 Feb 2020 08:01) (91/54 - 144/84)  BP(mean): --  RR: 18 (20 Feb 2020 08:01) (12 - 62)  SpO2: 96% (20 Feb 2020 08:01) (95% - 99%)    PAIN SCORE:    4     SCALE USED: (1-10 VNRS)        Affected extremity:          Dressing: clean/dry/intact post operative dressing.         Sensation:  intact to light touch. Supple left thigh, not tense         Motor exam:          5/ 5 Tibialis Anterior/Gastrocnemius-Soleus , EHL         warm well perfused; capillary refill <3 seconds     LABS:                        11.8   12.55 )-----------( 253      ( 20 Feb 2020 07:10 )             36.8     02-20    136  |  103  |  17  ----------------------------<  138<H>  4.6   |  24  |  0.83    Ca    9.0      20 Feb 2020 07:10    MEDICATIONS:    Anticoagulation:  aspirin enteric coated 81 milliGRAM(s) Oral every 12 hours      Antibiotics: Ancef      Pain medications:   acetaminophen   Tablet .. 1000 milliGRAM(s) Oral every 8 hours  celecoxib 200 milliGRAM(s) Oral every 12 hours  HYDROmorphone  Injectable 0.5 milliGRAM(s) IV Push every 3 hours PRN  ondansetron Injectable 4 milliGRAM(s) IV Push every 6 hours PRN  oxyCODONE    IR 5 milliGRAM(s) Oral every 3 hours PRN  oxyCODONE    IR 10 milliGRAM(s) Oral every 3 hours PRN      A/P :  s/p  Left TKR POD # 1  -    Pain control  -    DVT ppx: ASA   -    Check AM labs  -    Weight bearing status: WBAT   -    Physical Therapy  -    Dispo: Home

## 2020-02-20 NOTE — PROVIDER CONTACT NOTE (OTHER) - ACTION/TREATMENT ORDERED:
Dr. Goodwin notified. EKG ordered to be done. Dr. Goodwin came to assess and speak with patient. Maalox to be given now. will continue to monitor.

## 2020-02-21 LAB
ANION GAP SERPL CALC-SCNC: 10 MMOL/L — SIGNIFICANT CHANGE UP (ref 5–17)
BUN SERPL-MCNC: 21 MG/DL — SIGNIFICANT CHANGE UP (ref 7–23)
CALCIUM SERPL-MCNC: 8.7 MG/DL — SIGNIFICANT CHANGE UP (ref 8.4–10.5)
CHLORIDE SERPL-SCNC: 105 MMOL/L — SIGNIFICANT CHANGE UP (ref 96–108)
CO2 SERPL-SCNC: 26 MMOL/L — SIGNIFICANT CHANGE UP (ref 22–31)
CREAT SERPL-MCNC: 0.76 MG/DL — SIGNIFICANT CHANGE UP (ref 0.5–1.3)
GLUCOSE SERPL-MCNC: 89 MG/DL — SIGNIFICANT CHANGE UP (ref 70–99)
HCT VFR BLD CALC: 35.5 % — SIGNIFICANT CHANGE UP (ref 34.5–45)
HGB BLD-MCNC: 11.2 G/DL — LOW (ref 11.5–15.5)
MCHC RBC-ENTMCNC: 27.4 PG — SIGNIFICANT CHANGE UP (ref 27–34)
MCHC RBC-ENTMCNC: 31.5 GM/DL — LOW (ref 32–36)
MCV RBC AUTO: 86.8 FL — SIGNIFICANT CHANGE UP (ref 80–100)
NRBC # BLD: 0 /100 WBCS — SIGNIFICANT CHANGE UP (ref 0–0)
PLATELET # BLD AUTO: 206 K/UL — SIGNIFICANT CHANGE UP (ref 150–400)
POTASSIUM SERPL-MCNC: 3.9 MMOL/L — SIGNIFICANT CHANGE UP (ref 3.5–5.3)
POTASSIUM SERPL-SCNC: 3.9 MMOL/L — SIGNIFICANT CHANGE UP (ref 3.5–5.3)
RBC # BLD: 4.09 M/UL — SIGNIFICANT CHANGE UP (ref 3.8–5.2)
RBC # FLD: 13.2 % — SIGNIFICANT CHANGE UP (ref 10.3–14.5)
SODIUM SERPL-SCNC: 141 MMOL/L — SIGNIFICANT CHANGE UP (ref 135–145)
WBC # BLD: 7.63 K/UL — SIGNIFICANT CHANGE UP (ref 3.8–10.5)
WBC # FLD AUTO: 7.63 K/UL — SIGNIFICANT CHANGE UP (ref 3.8–10.5)

## 2020-02-21 PROCEDURE — 93970 EXTREMITY STUDY: CPT | Mod: 26

## 2020-02-21 PROCEDURE — 99232 SBSQ HOSP IP/OBS MODERATE 35: CPT

## 2020-02-21 RX ORDER — PANTOPRAZOLE SODIUM 20 MG/1
1 TABLET, DELAYED RELEASE ORAL
Qty: 30 | Refills: 1
Start: 2020-02-21 | End: 2020-04-20

## 2020-02-21 RX ORDER — CELECOXIB 200 MG/1
1 CAPSULE ORAL
Qty: 60 | Refills: 0
Start: 2020-02-21 | End: 2020-03-21

## 2020-02-21 RX ORDER — ASPIRIN/CALCIUM CARB/MAGNESIUM 324 MG
1 TABLET ORAL
Qty: 82 | Refills: 0
Start: 2020-02-21 | End: 2020-04-01

## 2020-02-21 RX ORDER — HYDROMORPHONE HYDROCHLORIDE 2 MG/ML
4 INJECTION INTRAMUSCULAR; INTRAVENOUS; SUBCUTANEOUS
Refills: 0 | Status: DISCONTINUED | OUTPATIENT
Start: 2020-02-21 | End: 2020-02-23

## 2020-02-21 RX ORDER — HYDROMORPHONE HYDROCHLORIDE 2 MG/ML
6 INJECTION INTRAMUSCULAR; INTRAVENOUS; SUBCUTANEOUS
Refills: 0 | Status: DISCONTINUED | OUTPATIENT
Start: 2020-02-21 | End: 2020-02-23

## 2020-02-21 RX ADMIN — Medication 81 MILLIGRAM(S): at 17:44

## 2020-02-21 RX ADMIN — HYDROMORPHONE HYDROCHLORIDE 6 MILLIGRAM(S): 2 INJECTION INTRAMUSCULAR; INTRAVENOUS; SUBCUTANEOUS at 13:14

## 2020-02-21 RX ADMIN — SENNA PLUS 2 TABLET(S): 8.6 TABLET ORAL at 21:18

## 2020-02-21 RX ADMIN — CELECOXIB 200 MILLIGRAM(S): 200 CAPSULE ORAL at 21:17

## 2020-02-21 RX ADMIN — CELECOXIB 200 MILLIGRAM(S): 200 CAPSULE ORAL at 09:28

## 2020-02-21 RX ADMIN — HYDROMORPHONE HYDROCHLORIDE 2 MILLIGRAM(S): 2 INJECTION INTRAMUSCULAR; INTRAVENOUS; SUBCUTANEOUS at 06:16

## 2020-02-21 RX ADMIN — HYDROMORPHONE HYDROCHLORIDE 4 MILLIGRAM(S): 2 INJECTION INTRAMUSCULAR; INTRAVENOUS; SUBCUTANEOUS at 22:00

## 2020-02-21 RX ADMIN — Medication 81 MILLIGRAM(S): at 06:16

## 2020-02-21 RX ADMIN — HYDROMORPHONE HYDROCHLORIDE 4 MILLIGRAM(S): 2 INJECTION INTRAMUSCULAR; INTRAVENOUS; SUBCUTANEOUS at 09:28

## 2020-02-21 RX ADMIN — CELECOXIB 200 MILLIGRAM(S): 200 CAPSULE ORAL at 08:58

## 2020-02-21 RX ADMIN — Medication 1000 MILLIGRAM(S): at 08:57

## 2020-02-21 RX ADMIN — HYDROMORPHONE HYDROCHLORIDE 4 MILLIGRAM(S): 2 INJECTION INTRAMUSCULAR; INTRAVENOUS; SUBCUTANEOUS at 21:18

## 2020-02-21 RX ADMIN — PANTOPRAZOLE SODIUM 40 MILLIGRAM(S): 20 TABLET, DELAYED RELEASE ORAL at 06:16

## 2020-02-21 RX ADMIN — POLYETHYLENE GLYCOL 3350 17 GRAM(S): 17 POWDER, FOR SOLUTION ORAL at 21:18

## 2020-02-21 RX ADMIN — HYDROMORPHONE HYDROCHLORIDE 4 MILLIGRAM(S): 2 INJECTION INTRAMUSCULAR; INTRAVENOUS; SUBCUTANEOUS at 08:58

## 2020-02-21 RX ADMIN — CELECOXIB 200 MILLIGRAM(S): 200 CAPSULE ORAL at 21:20

## 2020-02-21 RX ADMIN — HYDROMORPHONE HYDROCHLORIDE 6 MILLIGRAM(S): 2 INJECTION INTRAMUSCULAR; INTRAVENOUS; SUBCUTANEOUS at 13:44

## 2020-02-21 RX ADMIN — Medication 1000 MILLIGRAM(S): at 09:28

## 2020-02-21 RX ADMIN — HYDROMORPHONE HYDROCHLORIDE 2 MILLIGRAM(S): 2 INJECTION INTRAMUSCULAR; INTRAVENOUS; SUBCUTANEOUS at 06:46

## 2020-02-21 NOTE — PROGRESS NOTE ADULT - SUBJECTIVE AND OBJECTIVE BOX
Discharge medication calendar:  Aspirin EC 81mg q12h x 6 weeks  APAP 1000mg q8h x 2-3 weeks  Celecoxib 200mg q12h x 2-3 weeks  Omeprazole 40mg QAM (home med)  Narcotic PRN  Docusate 100mg TID while taking narcotic  Miralax, Senna, or Bisacodyl PRN for treatment of constipation

## 2020-02-21 NOTE — PROGRESS NOTE ADULT - SUBJECTIVE AND OBJECTIVE BOX
Patient is a 74y old  Female who presents with a chief complaint of left tkr (20 Feb 2020 15:06)      INTERVAL HPI/OVERNIGHT EVENTS: no acute events overnight. pain still uncontrolled. 9/10 with ambulation. 6-7/10 at rest. Planning for rehab - patient wants video tour of facilities. No chest pain, palpitations, dyspnea, abdominal pain, n/v/d. Eating and drinking well. Voiding. No BM yet.    I&O's Summary      LABS:                        11.2   7.63  )-----------( 206      ( 21 Feb 2020 07:58 )             35.5     02-21    141  |  105  |  21  ----------------------------<  89  3.9   |  26  |  0.76    Ca    8.7      21 Feb 2020 07:58          CAPILLARY BLOOD GLUCOSE                MEDICATIONS  (STANDING):  acetaminophen   Tablet .. 1000 milliGRAM(s) Oral every 8 hours  aspirin enteric coated 81 milliGRAM(s) Oral every 12 hours  celecoxib 200 milliGRAM(s) Oral every 12 hours  influenza   Vaccine 0.5 milliLiter(s) IntraMuscular once  pantoprazole    Tablet 40 milliGRAM(s) Oral before breakfast  polyethylene glycol 3350 17 Gram(s) Oral at bedtime  senna 2 Tablet(s) Oral at bedtime    MEDICATIONS  (PRN):  aluminum hydroxide/magnesium hydroxide/simethicone Suspension 30 milliLiter(s) Oral four times a day PRN Indigestion  bisacodyl Suppository 10 milliGRAM(s) Rectal daily PRN If no bowel movement by postoperative day #2  HYDROmorphone   Tablet 6 milliGRAM(s) Oral every 3 hours PRN Moderate Pain (4 - 6)  HYDROmorphone   Tablet 4 milliGRAM(s) Oral every 3 hours PRN Mild Pain (1 - 3)  HYDROmorphone  Injectable 0.5 milliGRAM(s) IV Push every 3 hours PRN Severe Pain (7 - 10)  magnesium hydroxide Suspension 30 milliLiter(s) Oral daily PRN Constipation  ondansetron Injectable 4 milliGRAM(s) IV Push every 6 hours PRN Nausea and/or Vomiting      REVIEW OF SYSTEMS:  CONSTITUTIONAL: No fever, weight loss, or fatigue  EYES: No eye pain, visual disturbances, or discharge  ENMT:  No difficulty hearing, tinnitus, vertigo; No sinus or throat pain  NECK: No pain or stiffness  RESPIRATORY: No cough, wheezing, chills or hemoptysis; No shortness of breath  CARDIOVASCULAR: No chest pain, palpitations, dizziness, or leg swelling  GASTROINTESTINAL: No abdominal or epigastric pain. No nausea, vomiting, or hematemesis; No diarrhea or constipation. No melena or hematochezia.  GENITOURINARY: No dysuria, frequency, hematuria, or incontinence  NEUROLOGICAL: No headaches, memory loss, loss of strength, numbness, or tremors  SKIN: No itching, burning, rashes, or lesions   LYMPH NODES: No enlarged glands  ENDOCRINE: No heat or cold intolerance; No hair loss  MUSCULOSKELETAL: No joint pain or swelling; No muscle, back, or extremity pain  PSYCHIATRIC: No depression, anxiety, mood swings, or difficulty sleeping  HEME/LYMPH: No easy bruising, or bleeding gums  ALLERGY AND IMMUNOLOGIC: No hives or eczema    RADIOLOGY & ADDITIONAL TESTS:    Imaging Personally Reviewed:  [x] YES  [ ] NO    Consultant(s) Notes Reviewed:  [x] YES  [ ] NO    PHYSICAL EXAM:  T(C): 36.4 (02-21-20 @ 07:40), Max: 36.9 (02-20-20 @ 23:50)  HR: 72 (02-21-20 @ 07:40) (72 - 80)  BP: 126/67 (02-21-20 @ 07:40) (113/72 - 130/79)  RR: 18 (02-21-20 @ 07:40) (16 - 18)  SpO2: 96% (02-21-20 @ 07:40) (93% - 96%)    GENERAL: NAD, well-groomed, well-developed  HEAD:  Atraumatic, Normocephalic  EYES: EOMI, PERRLA, conjunctiva and sclera clear  ENMT: No tonsillar erythema, exudates, or enlargement; Moist mucous membranes, Good dentition, No lesions  NECK: Supple, No JVD, Normal thyroid  NERVOUS SYSTEM:  Alert & Oriented X3, Good concentration; sensation to light touch intact  CHEST/LUNG: Clear to auscultation bilaterally; No rales, rhonchi, wheezing, or rubs  HEART: Regular rate and rhythm; No murmurs, rubs, or gallops  ABDOMEN: Soft, Nontender, Nondistended; Bowel sounds present  EXTREMITIES:  2+ Peripheral Pulses, No clubbing, cyanosis; lymphedema, left calf tenderness to palpation; s/p left tkr  LYMPH: No lymphadenopathy noted  SKIN: warm, well perfused    Care Discussed with Consultants/Other Providers [x] YES  [ ] NO

## 2020-02-21 NOTE — PROGRESS NOTE ADULT - SUBJECTIVE AND OBJECTIVE BOX
Orthopedic P.A.- POD# 2 - s/p Left TKR    Patient alert and c/o 9 out of 10 pain in her knee at all times (hx of low pain tolerance and meds adjusted multiple times). Denies nausea. Able to ambulate in her room yesterday with PT    Vital Signs Last 24 Hrs  T(C): 36.7 (21 Feb 2020 03:17), Max: 36.9 (20 Feb 2020 23:50)  T(F): 98 (21 Feb 2020 03:17), Max: 98.5 (20 Feb 2020 23:50)  HR: 80 (21 Feb 2020 03:17) (61 - 80)  BP: 122/79 (21 Feb 2020 03:17) (94/51 - 130/79)  BP(mean): --  RR: 16 (21 Feb 2020 03:17) (16 - 18)  SpO2: 94% (21 Feb 2020 03:17) (93% - 96%)         I&O's Detail                 Labs:   *** CBC and BMP in testing***                                                                               MEDICATIONS:acetaminophen   Tablet .. 1000 milliGRAM(s) Oral every 8 hours  aluminum hydroxide/magnesium hydroxide/simethicone Suspension 30 milliLiter(s) Oral four times a day PRN  aspirin enteric coated 81 milliGRAM(s) Oral every 12 hours  bisacodyl Suppository 10 milliGRAM(s) Rectal daily PRN  celecoxib 200 milliGRAM(s) Oral every 12 hours  HYDROmorphone   Tablet 6 milliGRAM(s) Oral every 3 hours PRN  HYDROmorphone   Tablet 4 milliGRAM(s) Oral every 3 hours PRN  HYDROmorphone  Injectable 0.5 milliGRAM(s) IV Push every 3 hours PRN  influenza   Vaccine 0.5 milliLiter(s) IntraMuscular once  magnesium hydroxide Suspension 30 milliLiter(s) Oral daily PRN  ondansetron Injectable 4 milliGRAM(s) IV Push every 6 hours PRN  pantoprazole    Tablet 40 milliGRAM(s) Oral before breakfast  polyethylene glycol 3350 17 Gram(s) Oral at bedtime  senna 2 Tablet(s) Oral at bedtime    Anticoagulation:  aspirin enteric coated 81 milliGRAM(s) Oral every 12 hours          Pain medications:   acetaminophen   Tablet .. 1000 milliGRAM(s) Oral every 8 hours  celecoxib 200 milliGRAM(s) Oral every 12 hours  HYDROmorphone   Tablet 6 milliGRAM(s) Oral every 3 hours PRN  HYDROmorphone   Tablet 4 milliGRAM(s) Oral every 3 hours PRN  HYDROmorphone  Injectable 0.5 milliGRAM(s) IV Push every 3 hours PRN  ondansetron Injectable 4 milliGRAM(s) IV Push every 6 hours PRN                                      Physical Exam:  Left knee- Primary surgical bandage removed and HOPE noted intact and patent over knee incision. Small amount bloody drainage noted on bandage. New ace bandage placed and large bag of ice. Neurovascular grossly intact LE's.  PAS on LE's.  Calves soft and non-tender.                                                                                                                                                          A/P:  Orthopedically stable.  -Continue pain management with above adjusted plan (Dilaudid increased and will offer every 3 hours)  -DVT prophylaxis with 6 weeks of Ecotrin.  -Check labs again today.  -Increase ambulation and ROM left knee with PT/OT  -Dr. Blancas  for continued medical care  -Discharge planning for Home tomorrow vs Rehab. Reassess progress with PT/OT and see their recommendations.  -Further plan as per attendings.

## 2020-02-21 NOTE — PROGRESS NOTE ADULT - ASSESSMENT
Pt is a 73 yo F presenting to [place] ED for elective L TKR. PMH Osteoarthritis, Pre-Diabetes.     Osteoarthritis: s/p left TKA POD #2  -multimodal pain control as ordered - regimen increased, pt with poor pain tolerance  -Caprini Score 9 points.   -ASA as ordered for VTE ppx  -wound care per ortho  -monitor hemoglobin and hemodynamics  -monitor hemodynamics  -dispo planning home vs [facility] - likely rehab  -PT/OT follow up  -with chronic lymphedema but will obtain lower extremity dopplers to eval for DVT  -bowel regimen with opioids  -incentive spirometry    Panic attack  -patient with hx of anxiety and GERD  -symptoms completely resolved with MOM  -EKG with PVCs and RBBB (old)    Pre-Diabetes: A1c 6.3%.   -monitor blood glucose.     DVT ppx: ASA as ordered.

## 2020-02-22 LAB
ANION GAP SERPL CALC-SCNC: 10 MMOL/L — SIGNIFICANT CHANGE UP (ref 5–17)
BUN SERPL-MCNC: 19 MG/DL — SIGNIFICANT CHANGE UP (ref 7–23)
CALCIUM SERPL-MCNC: 8.6 MG/DL — SIGNIFICANT CHANGE UP (ref 8.4–10.5)
CHLORIDE SERPL-SCNC: 104 MMOL/L — SIGNIFICANT CHANGE UP (ref 96–108)
CO2 SERPL-SCNC: 26 MMOL/L — SIGNIFICANT CHANGE UP (ref 22–31)
CREAT SERPL-MCNC: 0.66 MG/DL — SIGNIFICANT CHANGE UP (ref 0.5–1.3)
GLUCOSE SERPL-MCNC: 104 MG/DL — HIGH (ref 70–99)
HCT VFR BLD CALC: 34.3 % — LOW (ref 34.5–45)
HGB BLD-MCNC: 10.7 G/DL — LOW (ref 11.5–15.5)
MCHC RBC-ENTMCNC: 27.2 PG — SIGNIFICANT CHANGE UP (ref 27–34)
MCHC RBC-ENTMCNC: 31.2 GM/DL — LOW (ref 32–36)
MCV RBC AUTO: 87.3 FL — SIGNIFICANT CHANGE UP (ref 80–100)
NRBC # BLD: 0 /100 WBCS — SIGNIFICANT CHANGE UP (ref 0–0)
PLATELET # BLD AUTO: 206 K/UL — SIGNIFICANT CHANGE UP (ref 150–400)
POTASSIUM SERPL-MCNC: 4.2 MMOL/L — SIGNIFICANT CHANGE UP (ref 3.5–5.3)
POTASSIUM SERPL-SCNC: 4.2 MMOL/L — SIGNIFICANT CHANGE UP (ref 3.5–5.3)
RBC # BLD: 3.93 M/UL — SIGNIFICANT CHANGE UP (ref 3.8–5.2)
RBC # FLD: 13.2 % — SIGNIFICANT CHANGE UP (ref 10.3–14.5)
SODIUM SERPL-SCNC: 140 MMOL/L — SIGNIFICANT CHANGE UP (ref 135–145)
WBC # BLD: 6.08 K/UL — SIGNIFICANT CHANGE UP (ref 3.8–10.5)
WBC # FLD AUTO: 6.08 K/UL — SIGNIFICANT CHANGE UP (ref 3.8–10.5)

## 2020-02-22 PROCEDURE — 99232 SBSQ HOSP IP/OBS MODERATE 35: CPT

## 2020-02-22 RX ADMIN — Medication 1000 MILLIGRAM(S): at 23:16

## 2020-02-22 RX ADMIN — Medication 81 MILLIGRAM(S): at 05:58

## 2020-02-22 RX ADMIN — CELECOXIB 200 MILLIGRAM(S): 200 CAPSULE ORAL at 22:00

## 2020-02-22 RX ADMIN — Medication 1000 MILLIGRAM(S): at 23:30

## 2020-02-22 RX ADMIN — Medication 1000 MILLIGRAM(S): at 17:49

## 2020-02-22 RX ADMIN — CELECOXIB 200 MILLIGRAM(S): 200 CAPSULE ORAL at 21:48

## 2020-02-22 RX ADMIN — POLYETHYLENE GLYCOL 3350 17 GRAM(S): 17 POWDER, FOR SOLUTION ORAL at 21:48

## 2020-02-22 RX ADMIN — CELECOXIB 200 MILLIGRAM(S): 200 CAPSULE ORAL at 11:37

## 2020-02-22 RX ADMIN — Medication 81 MILLIGRAM(S): at 17:49

## 2020-02-22 RX ADMIN — PANTOPRAZOLE SODIUM 40 MILLIGRAM(S): 20 TABLET, DELAYED RELEASE ORAL at 05:58

## 2020-02-22 RX ADMIN — CELECOXIB 200 MILLIGRAM(S): 200 CAPSULE ORAL at 08:26

## 2020-02-22 RX ADMIN — SENNA PLUS 2 TABLET(S): 8.6 TABLET ORAL at 21:48

## 2020-02-22 RX ADMIN — Medication 1000 MILLIGRAM(S): at 08:27

## 2020-02-22 RX ADMIN — Medication 1000 MILLIGRAM(S): at 08:26

## 2020-02-22 NOTE — PROGRESS NOTE ADULT - SUBJECTIVE AND OBJECTIVE BOX
POST OPERATIVE DAY #: 3    74y Female  s/p  Left  TKA                 SUBJECTIVE: Patient seen and examined at bedside. tearful     Pain:  poorly controlled but refusing pain medications   Pain scale:  7 /10  Denies CP, SOB, N/V/D, weakness, numbness   No new complains     OBJECTIVE:     Vital Signs Last 24 Hrs  T(C): 36.7 (22 Feb 2020 08:22), Max: 36.7 (22 Feb 2020 08:22)  T(F): 98 (22 Feb 2020 08:22), Max: 98 (22 Feb 2020 08:22)  HR: 78 (22 Feb 2020 08:22) (64 - 79)  BP: 104/70 (22 Feb 2020 08:22) (101/62 - 116/69)  BP(mean): --  RR: 16 (22 Feb 2020 08:22) (16 - 18)  SpO2: 97% (22 Feb 2020 08:22) (94% - 97%)    Affected extremity:  LLE         Dressing: HOPE dressing,  clean/dry/intact                        Sensation: intact to light touch          Motor exam:   5/ 5 Tibialis Anterior/Gastrocnemius-Soleus, EHL/FHL         warm, well-perfused; capillary refill < 3 seconds         negative calf tenderness B/L LE      LABS:                        10.7   6.08  )-----------( 206      ( 22 Feb 2020 07:21 )             34.3     02-22    140  |  104  |  19  ----------------------------<  104<H>  4.2   |  26  |  0.66    Ca    8.6      22 Feb 2020 07:21    Doppler LE: negative for DVT     I&O's Detail      MEDICATIONS:    Pain management:  acetaminophen   Tablet .. 1000 milliGRAM(s) Oral every 8 hours  celecoxib 200 milliGRAM(s) Oral every 12 hours  HYDROmorphone   Tablet 6 milliGRAM(s) Oral every 3 hours PRN  HYDROmorphone   Tablet 4 milliGRAM(s) Oral every 3 hours PRN  HYDROmorphone  Injectable 0.5 milliGRAM(s) IV Push every 3 hours PRN  ondansetron Injectable 4 milliGRAM(s) IV Push every 6 hours PRN    DVT prophylaxis:   aspirin enteric coated 81 milliGRAM(s) Oral every 12 hours      RADIOLOGY & ADDITIONAL STUDIES:    ASSESSMENT AND PLAN:     - Analgesic pain control  - DVT prophylaxis: ASA 81 mg twice a day      SCDs       - Pain Management: Celebrex 200mg twice a day x 21 days   - PT/OT: Weight Bearing Status:  Weight bearing as tolerated, OOBTC       -  Incentive spirometry  - Advance diet as tolerated  - Hospitalist is following  -  Follow up labs  -  Disposition:  Subacute Rehab

## 2020-02-22 NOTE — PROGRESS NOTE ADULT - SUBJECTIVE AND OBJECTIVE BOX
Patient is a 74y old  Female who presents with a chief complaint of left tkr (21 Feb 2020 12:23)      INTERVAL HPI/OVERNIGHT EVENTS: no acute events overnight. seen ambulating with PT. Had pain meds yesterday but today refusing pain medication aside from standing tylenol and celebrex. States pain is manageable today - states better than yesterday. Is holding off narcotics unless absolutely needed. Pain is 8/10 - after ambulating with PT. She is upset regarding the swelling in her leg on top of her chronic lymphedema - was concerned for DVT before, now with negative dopplers - feels as though it must be infected. No sign of infection. Discharge planning to [facility] on Monday.    I&O's Summary      LABS:                        10.7   6.08  )-----------( 206      ( 22 Feb 2020 07:21 )             34.3     02-22    140  |  104  |  19  ----------------------------<  104<H>  4.2   |  26  |  0.66    Ca    8.6      22 Feb 2020 07:21          CAPILLARY BLOOD GLUCOSE                MEDICATIONS  (STANDING):  acetaminophen   Tablet .. 1000 milliGRAM(s) Oral every 8 hours  aspirin enteric coated 81 milliGRAM(s) Oral every 12 hours  celecoxib 200 milliGRAM(s) Oral every 12 hours  influenza   Vaccine 0.5 milliLiter(s) IntraMuscular once  pantoprazole    Tablet 40 milliGRAM(s) Oral before breakfast  polyethylene glycol 3350 17 Gram(s) Oral at bedtime  senna 2 Tablet(s) Oral at bedtime    MEDICATIONS  (PRN):  aluminum hydroxide/magnesium hydroxide/simethicone Suspension 30 milliLiter(s) Oral four times a day PRN Indigestion  bisacodyl Suppository 10 milliGRAM(s) Rectal daily PRN If no bowel movement by postoperative day #2  HYDROmorphone   Tablet 6 milliGRAM(s) Oral every 3 hours PRN Moderate Pain (4 - 6)  HYDROmorphone   Tablet 4 milliGRAM(s) Oral every 3 hours PRN Mild Pain (1 - 3)  HYDROmorphone  Injectable 0.5 milliGRAM(s) IV Push every 3 hours PRN Severe Pain (7 - 10)  magnesium hydroxide Suspension 30 milliLiter(s) Oral daily PRN Constipation  ondansetron Injectable 4 milliGRAM(s) IV Push every 6 hours PRN Nausea and/or Vomiting      REVIEW OF SYSTEMS:  CONSTITUTIONAL: No fever, weight loss, or fatigue  EYES: No eye pain, visual disturbances, or discharge  ENMT:  No difficulty hearing, tinnitus, vertigo; No sinus or throat pain  NECK: No pain or stiffness  RESPIRATORY: No cough, wheezing, chills or hemoptysis; No shortness of breath  CARDIOVASCULAR: No chest pain, palpitations, dizziness, or leg swelling  GASTROINTESTINAL: No abdominal or epigastric pain. No nausea, vomiting, or hematemesis; No diarrhea or constipation. No melena or hematochezia.  GENITOURINARY: No dysuria, frequency, hematuria, or incontinence  NEUROLOGICAL: No headaches, memory loss, loss of strength, numbness, or tremors  SKIN: No itching, burning, rashes, or lesions   LYMPH NODES: No enlarged glands  ENDOCRINE: No heat or cold intolerance; No hair loss  MUSCULOSKELETAL: left knee pain  PSYCHIATRIC: No depression, anxiety, mood swings, or difficulty sleeping  HEME/LYMPH: No easy bruising, or bleeding gums  ALLERGY AND IMMUNOLOGIC: No hives or eczema    RADIOLOGY & ADDITIONAL TESTS:    Imaging Personally Reviewed:  [x] YES  [ ] NO    Consultant(s) Notes Reviewed:  [x] YES  [ ] NO    PHYSICAL EXAM:  T(C): 36.6 (02-22-20 @ 10:07), Max: 36.7 (02-22-20 @ 08:22)  HR: 80 (02-22-20 @ 10:07) (75 - 80)  BP: 134/72 (02-22-20 @ 10:07) (101/62 - 134/72)  RR: 18 (02-22-20 @ 10:07) (16 - 18)  SpO2: 98% (02-22-20 @ 10:07) (96% - 98%)    GENERAL: NAD, well-groomed, well-developed  HEAD:  Atraumatic, Normocephalic  EYES: EOMI, PERRLA, conjunctiva and sclera clear  ENMT: No tonsillar erythema, exudates, or enlargement; Moist mucous membranes, Good dentition, No lesions  NECK: Supple, No JVD, Normal thyroid  NERVOUS SYSTEM:  Alert & Oriented X3, Good concentration; sensation to light touch intact  CHEST/LUNG: Clear to auscultation bilaterally; No rales, rhonchi, wheezing, or rubs  HEART: Regular rate and rhythm; No murmurs, rubs, or gallops  ABDOMEN: Soft, Nontender, Nondistended; Bowel sounds present  EXTREMITIES:  2+ Peripheral Pulses, No clubbing, cyanosis; lymphedema, left calf tenderness to palpation; s/p left tkr  LYMPH: No lymphadenopathy noted  SKIN: warm, well perfused; surgical site dressed dry blood noted on dressing, no evidence of infection    Care Discussed with Consultants/Other Providers [x] YES  [ ] NO

## 2020-02-22 NOTE — PROGRESS NOTE ADULT - ASSESSMENT
Pt is a 73 yo F presenting to [place] ED for elective left TKR. PMH Osteoarthritis, Pre-Diabetes.     Osteoarthritis: s/p left TKA POD #3  -multimodal pain control as ordered - regimen was increased, pt with poor pain tolerance.   -She has been offered pain control but she is holding off on narcotics unless absolutely needed.   -Offered alternative narcotic agent - does not want. She wants to continue with tylenol and celebrex for now.  -Caprini Score 9 points.   -ASA as ordered for VTE ppx  -wound care per ortho. No sign of infection on exam/lab-work/vitals.  -monitor hemoglobin and hemodynamics  -monitor hemodynamics  -dispo planning [facility] on Monday 2/24  -PT/OT follow up  -with chronic lymphedema. US dopplers negative for DVT bilaterally.  -bowel regimen with opioids  -incentive spirometry    Panic attack (on 2/20)  -patient with hx of anxiety and GERD  -symptoms completely resolved with MOM at the time. No recurrence of symptoms.  -EKG with PVCs and RBBB (old)    Pre-Diabetes: A1c 6.3%.   -monitor blood glucose on serum chemistries    DVT ppx: ASA as ordered.

## 2020-02-23 LAB
ANION GAP SERPL CALC-SCNC: 8 MMOL/L — SIGNIFICANT CHANGE UP (ref 5–17)
BUN SERPL-MCNC: 27 MG/DL — HIGH (ref 7–23)
CALCIUM SERPL-MCNC: 9.3 MG/DL — SIGNIFICANT CHANGE UP (ref 8.4–10.5)
CHLORIDE SERPL-SCNC: 105 MMOL/L — SIGNIFICANT CHANGE UP (ref 96–108)
CO2 SERPL-SCNC: 29 MMOL/L — SIGNIFICANT CHANGE UP (ref 22–31)
CREAT SERPL-MCNC: 1 MG/DL — SIGNIFICANT CHANGE UP (ref 0.5–1.3)
GLUCOSE SERPL-MCNC: 118 MG/DL — HIGH (ref 70–99)
HCT VFR BLD CALC: 37.6 % — SIGNIFICANT CHANGE UP (ref 34.5–45)
HGB BLD-MCNC: 11.6 G/DL — SIGNIFICANT CHANGE UP (ref 11.5–15.5)
MCHC RBC-ENTMCNC: 26.7 PG — LOW (ref 27–34)
MCHC RBC-ENTMCNC: 30.9 GM/DL — LOW (ref 32–36)
MCV RBC AUTO: 86.6 FL — SIGNIFICANT CHANGE UP (ref 80–100)
NRBC # BLD: 0 /100 WBCS — SIGNIFICANT CHANGE UP (ref 0–0)
PLATELET # BLD AUTO: 247 K/UL — SIGNIFICANT CHANGE UP (ref 150–400)
POTASSIUM SERPL-MCNC: 4.4 MMOL/L — SIGNIFICANT CHANGE UP (ref 3.5–5.3)
POTASSIUM SERPL-SCNC: 4.4 MMOL/L — SIGNIFICANT CHANGE UP (ref 3.5–5.3)
RBC # BLD: 4.34 M/UL — SIGNIFICANT CHANGE UP (ref 3.8–5.2)
RBC # FLD: 13 % — SIGNIFICANT CHANGE UP (ref 10.3–14.5)
SODIUM SERPL-SCNC: 142 MMOL/L — SIGNIFICANT CHANGE UP (ref 135–145)
WBC # BLD: 6.65 K/UL — SIGNIFICANT CHANGE UP (ref 3.8–10.5)
WBC # FLD AUTO: 6.65 K/UL — SIGNIFICANT CHANGE UP (ref 3.8–10.5)

## 2020-02-23 PROCEDURE — 99232 SBSQ HOSP IP/OBS MODERATE 35: CPT

## 2020-02-23 RX ORDER — OXYCODONE HYDROCHLORIDE 5 MG/1
5 TABLET ORAL
Refills: 0 | Status: DISCONTINUED | OUTPATIENT
Start: 2020-02-23 | End: 2020-02-24

## 2020-02-23 RX ADMIN — Medication 81 MILLIGRAM(S): at 06:09

## 2020-02-23 RX ADMIN — Medication 1000 MILLIGRAM(S): at 17:49

## 2020-02-23 RX ADMIN — CELECOXIB 200 MILLIGRAM(S): 200 CAPSULE ORAL at 08:56

## 2020-02-23 RX ADMIN — SENNA PLUS 2 TABLET(S): 8.6 TABLET ORAL at 21:34

## 2020-02-23 RX ADMIN — CELECOXIB 200 MILLIGRAM(S): 200 CAPSULE ORAL at 22:00

## 2020-02-23 RX ADMIN — Medication 81 MILLIGRAM(S): at 17:49

## 2020-02-23 RX ADMIN — POLYETHYLENE GLYCOL 3350 17 GRAM(S): 17 POWDER, FOR SOLUTION ORAL at 21:34

## 2020-02-23 RX ADMIN — CELECOXIB 200 MILLIGRAM(S): 200 CAPSULE ORAL at 08:57

## 2020-02-23 RX ADMIN — PANTOPRAZOLE SODIUM 40 MILLIGRAM(S): 20 TABLET, DELAYED RELEASE ORAL at 06:09

## 2020-02-23 RX ADMIN — Medication 1000 MILLIGRAM(S): at 08:56

## 2020-02-23 RX ADMIN — CELECOXIB 200 MILLIGRAM(S): 200 CAPSULE ORAL at 21:34

## 2020-02-23 RX ADMIN — Medication 1000 MILLIGRAM(S): at 08:57

## 2020-02-23 RX ADMIN — Medication 1000 MILLIGRAM(S): at 23:42

## 2020-02-23 NOTE — PROGRESS NOTE ADULT - ASSESSMENT
Pt is a 73 yo F presenting to [place] ED for elective left TKR. PMH Osteoarthritis, Pre-Diabetes.     Osteoarthritis: s/p left TKA POD #4  -multimodal pain control as ordered - regimen was increased, pt with poor pain tolerance.   -She has been offered pain control but she is holding off on narcotics unless absolutely needed.   -Will offer oxycodone 5mg q3hrs PRN for moderate to severe pain, as she has not been taking pain meds due to A/E  -continue with tylenol and celebrex for now.  -Caprini Score 9 points.   -ASA as ordered for VTE ppx  -wound care per ortho. No sign of infection on exam/lab-work/vitals.  -monitor hemoglobin and hemodynamics  -monitor hemodynamics  -dispo planning [facility] on Monday 2/24  -PT/OT follow up  -with chronic lymphedema. US dopplers negative for DVT bilaterally.  -bowel regimen with opioids  -incentive spirometry    Panic attack (on 2/20)  -patient with hx of anxiety and GERD  -symptoms completely resolved with MOM at the time. No recurrence of symptoms.  -EKG with PVCs and RBBB (old)    Pre-Diabetes: A1c 6.3%.   -monitor blood glucose on serum chemistries    DVT ppx: ASA as ordered.

## 2020-02-23 NOTE — PROGRESS NOTE ADULT - SUBJECTIVE AND OBJECTIVE BOX
Patient is a 74y old  Female who presents with a chief complaint of left tkr (22 Feb 2020 13:10)    INTERVAL HPI/OVERNIGHT EVENTS: no acute events overnight. pain is improved at present - 6/10 after ambulation. Seen with daughter at bedside - does not want her taking pain medications as she does not want her to become dependent and also higher doses of pain meds gave her adverse effects. Pt has been refusing pain medications for past 2 days - relates made her loopy as well. No chest pain, dyspnea, abdominal pain, n/v/d.    I&O's Summary      LABS:                        11.6   6.65  )-----------( 247      ( 23 Feb 2020 08:09 )             37.6     02-23    142  |  105  |  27<H>  ----------------------------<  118<H>  4.4   |  29  |  1.00    Ca    9.3      23 Feb 2020 08:09          CAPILLARY BLOOD GLUCOSE                MEDICATIONS  (STANDING):  acetaminophen   Tablet .. 1000 milliGRAM(s) Oral every 8 hours  aspirin enteric coated 81 milliGRAM(s) Oral every 12 hours  celecoxib 200 milliGRAM(s) Oral every 12 hours  pantoprazole    Tablet 40 milliGRAM(s) Oral before breakfast  polyethylene glycol 3350 17 Gram(s) Oral at bedtime  senna 2 Tablet(s) Oral at bedtime    MEDICATIONS  (PRN):  aluminum hydroxide/magnesium hydroxide/simethicone Suspension 30 milliLiter(s) Oral four times a day PRN Indigestion  bisacodyl Suppository 10 milliGRAM(s) Rectal daily PRN If no bowel movement by postoperative day #2  HYDROmorphone  Injectable 0.5 milliGRAM(s) IV Push every 3 hours PRN Severe Pain (7 - 10)  magnesium hydroxide Suspension 30 milliLiter(s) Oral daily PRN Constipation  ondansetron Injectable 4 milliGRAM(s) IV Push every 6 hours PRN Nausea and/or Vomiting  oxyCODONE    IR 5 milliGRAM(s) Oral every 3 hours PRN Moderate Pain (4 - 6) to Severe Pain (7-10)      REVIEW OF SYSTEMS:  CONSTITUTIONAL: No fever, weight loss, or fatigue  EYES: No eye pain, visual disturbances, or discharge  ENMT:  No difficulty hearing, tinnitus, vertigo; No sinus or throat pain  NECK: No pain or stiffness  RESPIRATORY: No cough, wheezing, chills or hemoptysis; No shortness of breath  CARDIOVASCULAR: No chest pain, palpitations, dizziness, or leg swelling  GASTROINTESTINAL: No abdominal or epigastric pain. No nausea, vomiting, or hematemesis; No diarrhea or constipation. No melena or hematochezia.  GENITOURINARY: No dysuria, frequency, hematuria, or incontinence  NEUROLOGICAL: No headaches, memory loss, loss of strength, numbness, or tremors  SKIN: No itching, burning, rashes, or lesions   LYMPH NODES: No enlarged glands  ENDOCRINE: No heat or cold intolerance; No hair loss  MUSCULOSKELETAL: left knee pain  PSYCHIATRIC: No depression, anxiety, mood swings, or difficulty sleeping  HEME/LYMPH: No easy bruising, or bleeding gums  ALLERGY AND IMMUNOLOGIC: No hives or eczema    RADIOLOGY & ADDITIONAL TESTS:    Imaging Personally Reviewed:  [x] YES  [ ] NO    Consultant(s) Notes Reviewed:  [x] YES  [ ] NO    PHYSICAL EXAM:  T(C): 36.8 (02-23-20 @ 07:49), Max: 37.2 (02-22-20 @ 23:00)  HR: 85 (02-23-20 @ 07:49) (85 - 94)  BP: 125/82 (02-23-20 @ 07:49) (92/52 - 126/77)  RR: 16 (02-23-20 @ 07:49) (16 - 18)  SpO2: 95% (02-23-20 @ 07:49) (95% - 99%)    GENERAL: NAD, well-groomed, well-developed  HEAD:  Atraumatic, Normocephalic  EYES: EOMI, PERRLA, conjunctiva and sclera clear  ENMT: No tonsillar erythema, exudates, or enlargement; Moist mucous membranes, Good dentition, No lesions  NECK: Supple, No JVD, Normal thyroid  NERVOUS SYSTEM:  Alert & Oriented X3, Good concentration; sensation to light touch intact  CHEST/LUNG: Clear to auscultation bilaterally; No rales, rhonchi, wheezing, or rubs  HEART: Regular rate and rhythm; No murmurs, rubs, or gallops  ABDOMEN: Soft, Nontender, Nondistended; Bowel sounds present  EXTREMITIES:  2+ Peripheral Pulses, No clubbing, cyanosis; lymphedema; s/p left tkr  LYMPH: No lymphadenopathy noted  SKIN: warm, well perfused; surgical site dressed, no extending erythema or warmth suggestive of infection    Care Discussed with Consultants/Other Providers [x] YES  [ ] NO

## 2020-02-23 NOTE — PROGRESS NOTE ADULT - SUBJECTIVE AND OBJECTIVE BOX
[facility] [place]                                                                [id number]                                                     Allergies---Anacin (Other)  erythromycin (Pruritus)  Lipitor (Other)        Pt is a 74y year old Female s/p left TKR.   Pt. is A&O x 3, resting comfortably, with no complaints.   Pain is 4/10.   Tolerating the diet.   Denies chest pain / shortness of breath / dyspnea / nausea / vomiting / headaches or light headed ness.         Vital Signs Last 24 Hrs  T(F): 98.3 (02-23-20 @ 07:49), Max: 98.9 (02-22-20 @ 23:00)  HR: 85 (02-23-20 @ 07:49)  BP: 125/82 (02-23-20 @ 07:49)  RR: 16 (02-23-20 @ 07:49)  SpO2: 95% (02-23-20 @ 07:49)    I&O's Detail        PE:   Left Lower Extremity:   Johnson Dressing is C/D/I.   No redness, swelling, heat, discharge or other evidence of infection, superficial or deep.   Neurovascularly intact.   No gross evidence of motor or sensory deficit.   +2 DP/PT pulses.   EHL/FHL/TA intact.   Toes are pink and mobile.   Capillary refill < 2 seconds.   Positive calf tenderness on the left (dopplers are negative).   PAS on.          Labs pending        A:   Pt is a 74y year old Female S/P left total knee replacement, Post Op Day #4        Plan:    - Follow up with Medicine    - OOB with PT/OT   - Pain control    - Incentive spirometry   - Discharge Planning   - DVT ppx = PAS +  aspirin enteric coated 81 milliGRAM(s) Oral every 12 hours                                                                                                                                                                             Haider BOJORQUEZ

## 2020-02-24 VITALS
SYSTOLIC BLOOD PRESSURE: 112 MMHG | HEART RATE: 88 BPM | DIASTOLIC BLOOD PRESSURE: 78 MMHG | OXYGEN SATURATION: 95 % | RESPIRATION RATE: 17 BRPM | TEMPERATURE: 98 F

## 2020-02-24 LAB
ANION GAP SERPL CALC-SCNC: 3 MMOL/L — LOW (ref 5–17)
BUN SERPL-MCNC: 29 MG/DL — HIGH (ref 7–23)
CALCIUM SERPL-MCNC: 8.8 MG/DL — SIGNIFICANT CHANGE UP (ref 8.4–10.5)
CHLORIDE SERPL-SCNC: 103 MMOL/L — SIGNIFICANT CHANGE UP (ref 96–108)
CO2 SERPL-SCNC: 27 MMOL/L — SIGNIFICANT CHANGE UP (ref 22–31)
CREAT SERPL-MCNC: 0.69 MG/DL — SIGNIFICANT CHANGE UP (ref 0.5–1.3)
GLUCOSE SERPL-MCNC: 107 MG/DL — HIGH (ref 70–99)
HCT VFR BLD CALC: 33.7 % — LOW (ref 34.5–45)
HGB BLD-MCNC: 10.7 G/DL — LOW (ref 11.5–15.5)
MCHC RBC-ENTMCNC: 27.3 PG — SIGNIFICANT CHANGE UP (ref 27–34)
MCHC RBC-ENTMCNC: 31.8 GM/DL — LOW (ref 32–36)
MCV RBC AUTO: 86 FL — SIGNIFICANT CHANGE UP (ref 80–100)
NRBC # BLD: 0 /100 WBCS — SIGNIFICANT CHANGE UP (ref 0–0)
PLATELET # BLD AUTO: 220 K/UL — SIGNIFICANT CHANGE UP (ref 150–400)
POTASSIUM SERPL-MCNC: 4.2 MMOL/L — SIGNIFICANT CHANGE UP (ref 3.5–5.3)
POTASSIUM SERPL-SCNC: 4.2 MMOL/L — SIGNIFICANT CHANGE UP (ref 3.5–5.3)
RBC # BLD: 3.92 M/UL — SIGNIFICANT CHANGE UP (ref 3.8–5.2)
RBC # FLD: 13.2 % — SIGNIFICANT CHANGE UP (ref 10.3–14.5)
SODIUM SERPL-SCNC: 133 MMOL/L — LOW (ref 135–145)
WBC # BLD: 6.19 K/UL — SIGNIFICANT CHANGE UP (ref 3.8–10.5)
WBC # FLD AUTO: 6.19 K/UL — SIGNIFICANT CHANGE UP (ref 3.8–10.5)

## 2020-02-24 PROCEDURE — 88305 TISSUE EXAM BY PATHOLOGIST: CPT

## 2020-02-24 PROCEDURE — C1776: CPT

## 2020-02-24 PROCEDURE — 97535 SELF CARE MNGMENT TRAINING: CPT

## 2020-02-24 PROCEDURE — 93005 ELECTROCARDIOGRAM TRACING: CPT

## 2020-02-24 PROCEDURE — 85018 HEMOGLOBIN: CPT

## 2020-02-24 PROCEDURE — 85027 COMPLETE CBC AUTOMATED: CPT

## 2020-02-24 PROCEDURE — C1713: CPT

## 2020-02-24 PROCEDURE — 73562 X-RAY EXAM OF KNEE 3: CPT

## 2020-02-24 PROCEDURE — 99239 HOSP IP/OBS DSCHRG MGMT >30: CPT

## 2020-02-24 PROCEDURE — 97161 PT EVAL LOW COMPLEX 20 MIN: CPT

## 2020-02-24 PROCEDURE — 36415 COLL VENOUS BLD VENIPUNCTURE: CPT

## 2020-02-24 PROCEDURE — 97116 GAIT TRAINING THERAPY: CPT

## 2020-02-24 PROCEDURE — 93970 EXTREMITY STUDY: CPT

## 2020-02-24 PROCEDURE — 97110 THERAPEUTIC EXERCISES: CPT

## 2020-02-24 PROCEDURE — 94664 DEMO&/EVAL PT USE INHALER: CPT

## 2020-02-24 PROCEDURE — 88311 DECALCIFY TISSUE: CPT

## 2020-02-24 PROCEDURE — 97165 OT EVAL LOW COMPLEX 30 MIN: CPT

## 2020-02-24 PROCEDURE — 85014 HEMATOCRIT: CPT

## 2020-02-24 PROCEDURE — 97530 THERAPEUTIC ACTIVITIES: CPT

## 2020-02-24 PROCEDURE — 80048 BASIC METABOLIC PNL TOTAL CA: CPT

## 2020-02-24 PROCEDURE — C1889: CPT

## 2020-02-24 RX ORDER — OXYCODONE HYDROCHLORIDE 5 MG/1
1 TABLET ORAL
Qty: 0 | Refills: 0 | DISCHARGE
Start: 2020-02-24

## 2020-02-24 RX ORDER — OXYCODONE HYDROCHLORIDE 5 MG/1
2.5 TABLET ORAL ONCE
Refills: 0 | Status: DISCONTINUED | OUTPATIENT
Start: 2020-02-24 | End: 2020-02-24

## 2020-02-24 RX ORDER — OXYCODONE HYDROCHLORIDE 5 MG/1
1 TABLET ORAL
Qty: 42 | Refills: 0
Start: 2020-02-24

## 2020-02-24 RX ORDER — OXYCODONE HYDROCHLORIDE 5 MG/1
1 TABLET ORAL
Qty: 1 | Refills: 0
Start: 2020-02-24

## 2020-02-24 RX ADMIN — OXYCODONE HYDROCHLORIDE 2.5 MILLIGRAM(S): 5 TABLET ORAL at 14:40

## 2020-02-24 RX ADMIN — Medication 1000 MILLIGRAM(S): at 16:46

## 2020-02-24 RX ADMIN — Medication 81 MILLIGRAM(S): at 05:36

## 2020-02-24 RX ADMIN — PANTOPRAZOLE SODIUM 40 MILLIGRAM(S): 20 TABLET, DELAYED RELEASE ORAL at 05:37

## 2020-02-24 RX ADMIN — Medication 1000 MILLIGRAM(S): at 00:10

## 2020-02-24 RX ADMIN — CELECOXIB 200 MILLIGRAM(S): 200 CAPSULE ORAL at 08:55

## 2020-02-24 RX ADMIN — Medication 1000 MILLIGRAM(S): at 08:55

## 2020-02-24 RX ADMIN — CELECOXIB 200 MILLIGRAM(S): 200 CAPSULE ORAL at 08:26

## 2020-02-24 RX ADMIN — Medication 1000 MILLIGRAM(S): at 08:26

## 2020-02-24 RX ADMIN — Medication 81 MILLIGRAM(S): at 16:46

## 2020-02-24 RX ADMIN — Medication 1000 MILLIGRAM(S): at 16:45

## 2020-02-24 RX ADMIN — OXYCODONE HYDROCHLORIDE 2.5 MILLIGRAM(S): 5 TABLET ORAL at 13:48

## 2020-02-24 NOTE — PROGRESS NOTE ADULT - SUBJECTIVE AND OBJECTIVE BOX
POST OPERATIVE DAY #:  [4 ]   STATUS POST: [x ] Left [ ] Right  [ x]TKR [ ]THR                        Patient is a 74y old  Female who presents with a chief complaint of left tkr (23 Feb 2020 11:37)      Pain well controlled    OBJECTIVE:     Vital Signs Last 24 Hrs  T(C): 36.7 (24 Feb 2020 07:58), Max: 36.8 (23 Feb 2020 23:30)  T(F): 98 (24 Feb 2020 07:58), Max: 98.2 (23 Feb 2020 23:30)  HR: 77 (24 Feb 2020 07:58) (77 - 88)  BP: 148/81 (24 Feb 2020 07:58) (112/70 - 148/81)  BP(mean): --  RR: 18 (24 Feb 2020 07:58) (16 - 18)  SpO2: 93% (24 Feb 2020 07:58) (93% - 96%)    Affected extremity:          Dressing:  clean/dry/intact  [ ] Other:           Sensation; intact to light touch  [ ] decreased:          Motor exam: Toes warm and mobile well perfused;  +capillary refill         No calf tenderness bilateral LE's    LABS:                        10.7   6.19  )-----------( 220      ( 24 Feb 2020 07:56 )             33.7     02-24    133<L>  |  103  |  29<H>  ----------------------------<  107<H>  4.2   |  27  |  0.69    Ca    8.8      24 Feb 2020 07:56              A/P :    -    Analgesics PRN  -    DVT ppx: [x ]ASA 81 bid [ ] Lovenox [ ] Coumadin   [ ] Eliquis   -    Weight bearing status: WBAT [ x]        PWB    [ ]     TTWB  [ ]      NWB  [ ]  -    Physical Therapy  -    Occupational Therapy  -    Dispo: Home [ ]     Rehab [ ]      FIGUEROA [x ]  today    To be determined [ ]

## 2020-02-24 NOTE — PROGRESS NOTE ADULT - SUBJECTIVE AND OBJECTIVE BOX
Patient is a 74y old  Female who presents with a chief complaint of left tkr (23 Feb 2020 11:37)    INTERVAL HPI/OVERNIGHT EVENTS: no acute events overnight. patient with improved pain 5/10 - states with the new ice pack. feels a lot better. she is upset with her children and family dynamics. otherwise feeling well. voiding, having BM. for subacute rehab.    I&O's Summary      LABS:                        10.7   6.19  )-----------( 220      ( 24 Feb 2020 07:56 )             33.7     02-24    133<L>  |  103  |  29<H>  ----------------------------<  107<H>  4.2   |  27  |  0.69    Ca    8.8      24 Feb 2020 07:56          CAPILLARY BLOOD GLUCOSE                MEDICATIONS  (STANDING):  acetaminophen   Tablet .. 1000 milliGRAM(s) Oral every 8 hours  aspirin enteric coated 81 milliGRAM(s) Oral every 12 hours  celecoxib 200 milliGRAM(s) Oral every 12 hours  pantoprazole    Tablet 40 milliGRAM(s) Oral before breakfast  polyethylene glycol 3350 17 Gram(s) Oral at bedtime  senna 2 Tablet(s) Oral at bedtime    MEDICATIONS  (PRN):  aluminum hydroxide/magnesium hydroxide/simethicone Suspension 30 milliLiter(s) Oral four times a day PRN Indigestion  bisacodyl Suppository 10 milliGRAM(s) Rectal daily PRN If no bowel movement by postoperative day #2  HYDROmorphone  Injectable 0.5 milliGRAM(s) IV Push every 3 hours PRN Severe Pain (7 - 10)  magnesium hydroxide Suspension 30 milliLiter(s) Oral daily PRN Constipation  ondansetron Injectable 4 milliGRAM(s) IV Push every 6 hours PRN Nausea and/or Vomiting  oxyCODONE    IR 5 milliGRAM(s) Oral every 3 hours PRN Moderate Pain (4 - 6) to Severe Pain (7-10)      REVIEW OF SYSTEMS:  CONSTITUTIONAL: No fever, weight loss, or fatigue  EYES: No eye pain, visual disturbances, or discharge  ENMT:  No difficulty hearing, tinnitus, vertigo; No sinus or throat pain  NECK: No pain or stiffness  RESPIRATORY: No cough, wheezing, chills or hemoptysis; No shortness of breath  CARDIOVASCULAR: No chest pain, palpitations, dizziness, or leg swelling  GASTROINTESTINAL: No abdominal or epigastric pain. No nausea, vomiting, or hematemesis; No diarrhea or constipation. No melena or hematochezia.  GENITOURINARY: No dysuria, frequency, hematuria, or incontinence  NEUROLOGICAL: No headaches, memory loss, loss of strength, numbness, or tremors  SKIN: No itching, burning, rashes, or lesions   LYMPH NODES: No enlarged glands  ENDOCRINE: No heat or cold intolerance; No hair loss  MUSCULOSKELETAL: left knee pain  PSYCHIATRIC: No depression, anxiety, mood swings, or difficulty sleeping  HEME/LYMPH: No easy bruising, or bleeding gums  ALLERGY AND IMMUNOLOGIC: No hives or eczema    RADIOLOGY & ADDITIONAL TESTS:    Imaging Personally Reviewed:  [x] YES  [ ] NO    Consultant(s) Notes Reviewed:  [x] YES  [ ] NO    PHYSICAL EXAM:  T(C): 36.7 (02-24-20 @ 07:58), Max: 36.8 (02-23-20 @ 23:30)  HR: 77 (02-24-20 @ 07:58) (77 - 88)  BP: 148/81 (02-24-20 @ 07:58) (112/70 - 148/81)  RR: 18 (02-24-20 @ 07:58) (16 - 18)  SpO2: 93% (02-24-20 @ 07:58) (93% - 96%)    GENERAL: NAD, well-groomed, well-developed  HEAD:  Atraumatic, Normocephalic  EYES: EOMI, PERRLA, conjunctiva and sclera clear  ENMT: No tonsillar erythema, exudates, or enlargement; Moist mucous membranes, Good dentition, No lesions  NECK: Supple, No JVD, Normal thyroid  NERVOUS SYSTEM:  Alert & Oriented X3, Good concentration; sensation to light touch intact  CHEST/LUNG: Clear to auscultation bilaterally; No rales, rhonchi, wheezing, or rubs  HEART: Regular rate and rhythm; No murmurs, rubs, or gallops  ABDOMEN: Soft, Nontender, Nondistended; Bowel sounds present  EXTREMITIES:  2+ Peripheral Pulses, No clubbing, cyanosis, or edema; s/p left tkr  LYMPH: No lymphadenopathy noted  SKIN: warm, well perfused; surgical site c/d/i    Care Discussed with Consultants/Other Providers [x] YES  [ ] NO

## 2020-02-24 NOTE — PROGRESS NOTE ADULT - ASSESSMENT
Pt is a 75 yo F presenting to [facility] for elective left TKR. PMH Osteoarthritis, Pre-Diabetes.     Osteoarthritis: s/p left TKA POD #4  -multimodal pain control as ordered - regimen was increased, pt with poor pain tolerance.   -She has been offered pain control but she is holding off on narcotics unless absolutely needed.   -Will offer oxycodone 5mg q3hrs PRN for moderate to severe pain, as she has not been taking pain meds due to A/E  -continue with tylenol and celebrex for now.  -Caprini Score 9 points.   -ASA as ordered for VTE ppx  -wound care per ortho. No sign of infection on exam/lab-work/vitals.  -monitor hemoglobin and electrolytes - stable; HD stable  -PT/OT follow up  -with chronic lymphedema. US dopplers negative for DVT bilaterally.  -bowel regimen with opioids  -incentive spirometry  -stable for dc to rehab    Panic attack (on 2/20)  -patient with hx of anxiety and GERD  -symptoms completely resolved with MOM at the time. No recurrence of symptoms.  -EKG with PVCs and RBBB (old)    Pre-Diabetes: A1c 6.3%.   -monitor blood glucose on serum chemistries    DVT ppx: ASA as ordered.

## 2020-03-09 ENCOUNTER — APPOINTMENT (OUTPATIENT)
Dept: ORTHOPEDIC SURGERY | Facility: CLINIC | Age: 75
End: 2020-03-09
Payer: MEDICARE

## 2020-03-09 VITALS — SYSTOLIC BLOOD PRESSURE: 144 MMHG | HEART RATE: 73 BPM | DIASTOLIC BLOOD PRESSURE: 74 MMHG

## 2020-03-09 PROCEDURE — 73562 X-RAY EXAM OF KNEE 3: CPT | Mod: LT

## 2020-03-09 PROCEDURE — 99024 POSTOP FOLLOW-UP VISIT: CPT

## 2020-03-09 RX ORDER — AMOXICILLIN 500 MG/1
500 CAPSULE ORAL
Qty: 20 | Refills: 4 | Status: ACTIVE | COMMUNITY
Start: 2020-03-09 | End: 1900-01-01

## 2020-04-20 ENCOUNTER — APPOINTMENT (OUTPATIENT)
Dept: ORTHOPEDIC SURGERY | Facility: CLINIC | Age: 75
End: 2020-04-20
Payer: MEDICARE

## 2020-04-20 PROCEDURE — 99024 POSTOP FOLLOW-UP VISIT: CPT

## 2020-04-20 RX ORDER — CELECOXIB 200 MG/1
200 CAPSULE ORAL TWICE DAILY
Qty: 60 | Refills: 1 | Status: ACTIVE | COMMUNITY
Start: 2020-04-20 | End: 1900-01-01

## 2020-04-21 ENCOUNTER — APPOINTMENT (OUTPATIENT)
Dept: ORTHOPEDIC SURGERY | Facility: CLINIC | Age: 75
End: 2020-04-21

## 2020-07-28 ENCOUNTER — APPOINTMENT (OUTPATIENT)
Dept: ORTHOPEDIC SURGERY | Facility: CLINIC | Age: 75
End: 2020-07-28
Payer: MEDICARE

## 2020-07-28 VITALS
HEART RATE: 67 BPM | DIASTOLIC BLOOD PRESSURE: 84 MMHG | SYSTOLIC BLOOD PRESSURE: 140 MMHG | TEMPERATURE: 97.9 F | HEIGHT: 62 IN

## 2020-07-28 DIAGNOSIS — E66.9 OBESITY, UNSPECIFIED: ICD-10-CM

## 2020-07-28 DIAGNOSIS — M25.562 PAIN IN LEFT KNEE: ICD-10-CM

## 2020-07-28 DIAGNOSIS — M62.81 MUSCLE WEAKNESS (GENERALIZED): ICD-10-CM

## 2020-07-28 PROCEDURE — 73562 X-RAY EXAM OF KNEE 3: CPT | Mod: LT

## 2020-07-28 PROCEDURE — 99213 OFFICE O/P EST LOW 20 MIN: CPT

## 2021-08-17 ENCOUNTER — APPOINTMENT (OUTPATIENT)
Dept: ORTHOPEDIC SURGERY | Facility: CLINIC | Age: 76
End: 2021-08-17
Payer: MEDICARE

## 2021-08-17 ENCOUNTER — NON-APPOINTMENT (OUTPATIENT)
Age: 76
End: 2021-08-17

## 2021-08-17 VITALS
BODY MASS INDEX: 40.32 KG/M2 | HEART RATE: 84 BPM | WEIGHT: 219.1 LBS | DIASTOLIC BLOOD PRESSURE: 89 MMHG | HEIGHT: 62 IN | SYSTOLIC BLOOD PRESSURE: 140 MMHG

## 2021-08-17 DIAGNOSIS — Z96.652 PRESENCE OF LEFT ARTIFICIAL KNEE JOINT: ICD-10-CM

## 2021-08-17 PROCEDURE — 99214 OFFICE O/P EST MOD 30 MIN: CPT

## 2021-08-17 PROCEDURE — 73562 X-RAY EXAM OF KNEE 3: CPT | Mod: LT

## 2022-05-12 NOTE — ANESTHESIA FOLLOW-UP NOTE - NSEVALATION_GEN_ALL_CORE
The patient feels that the cataract is significantly impacting daily activities and has elected cataract surgery. The risks, benefits, and alternatives to surgery were discussed. The patient elects to proceed with surgery. No apparent complications or complaints regarding anesthesia care at this time

## 2022-09-14 NOTE — H&P PST ADULT - DOCUMENT STATUS
PROCEDURE: 

CT abdomen and pelvis with contrast.



TECHNIQUE: 

Multiple contiguous axial images were obtained through the

abdomen and pelvis after administration of intravenous contrast.

Auto Exposure Controls were utilized during the CT exam to meet

ALARA standards for radiation dose reduction. All CT scans use

one or more of the following dose optimizing techniques:

automated exposure control, MA and/or KvP adjustment based on

patient size and exam type or iterative reconstruction.



INDICATION:  

Left lower quadrant palpable abnormality. 



COMPARISON:    

Abdominal/pelvic CT of 10/01/2021.



FINDINGS:

There is no abdominal/pelvic ascites, abscess, hematoma, or acute

fluid collection. No abdominal wall defect or hernia is found. No

abdominal wall mass or fluid collection. There is stool within

the colon. The fecal load is not grossly pathologic. The liver,

gallbladder, bile ducts, spleen, adrenals, and pancreas are

unremarkable. The unobstructed kidneys are normal. There are

aortoiliac and mesenteric atherosclerotic vascular

calcifications, chronic. No vascular obstruction or thrombus. No

findings of end organ ischemia. There are a few noninflamed

scattered sigmoid diverticula. The urinary bladder wall is mildly

thickened but poorly distended, likely on that basis. No

perinephric or perivesical edema. No hydroureteronephrosis. No

opaque stone. 



IMPRESSION: 

1. Extensive atherosclerotic disease without demonstrated

thrombus, vessel rupture, or acute arterial pathology and no

evidence for end organ ischemia. 



2. No mass, fluid collection, or hernia. No findings to explain

the palpable abnormality described in the left lower quadrant.



Dictated by: 



  Dictated on workstation # [id number]
Authored by Resident/PA/NP

## 2023-03-09 NOTE — DISCHARGE NOTE NURSING/CASE MANAGEMENT/SOCIAL WORK - CAREGIVER NAME
[facility]  Noted Dr. James has accepted for [facility]. Will follow for disposition    Destiny Green RN  Acute Rehab Admission Nurse   [facility]/ Loren Gay

## 2023-04-24 ENCOUNTER — OFFICE (OUTPATIENT)
Dept: URBAN - METROPOLITAN AREA CLINIC 104 | Facility: CLINIC | Age: 78
Setting detail: OPHTHALMOLOGY
End: 2023-04-24
Payer: MEDICARE

## 2023-04-24 DIAGNOSIS — H04.123: ICD-10-CM

## 2023-04-24 DIAGNOSIS — H16.223: ICD-10-CM

## 2023-04-24 DIAGNOSIS — H35.3131: ICD-10-CM

## 2023-04-24 DIAGNOSIS — H01.001: ICD-10-CM

## 2023-04-24 DIAGNOSIS — H43.813: ICD-10-CM

## 2023-04-24 DIAGNOSIS — H04.122: ICD-10-CM

## 2023-04-24 DIAGNOSIS — E11.9: ICD-10-CM

## 2023-04-24 DIAGNOSIS — H01.004: ICD-10-CM

## 2023-04-24 DIAGNOSIS — Z96.1: ICD-10-CM

## 2023-04-24 DIAGNOSIS — H04.121: ICD-10-CM

## 2023-04-24 PROCEDURE — 92134 CPTRZ OPH DX IMG PST SGM RTA: CPT | Performed by: OPHTHALMOLOGY

## 2023-04-24 PROCEDURE — 99213 OFFICE O/P EST LOW 20 MIN: CPT | Performed by: OPHTHALMOLOGY

## 2023-04-24 ASSESSMENT — SUPERFICIAL PUNCTATE KERATITIS (SPK)
OS_SPK: T
OD_SPK: T

## 2023-04-24 ASSESSMENT — DECREASING TEAR LAKE - SEVERITY SCORE
OS_DEC_TEARLAKE: 1+
OD_DEC_TEARLAKE: 1+

## 2023-04-24 ASSESSMENT — LID EXAM ASSESSMENTS
OD_BLEPHARITIS: RUL 1+
OS_BLEPHARITIS: LUL T

## 2023-04-24 ASSESSMENT — VISUAL ACUITY
OS_BCVA: 20/20
OD_BCVA: 20/20

## 2023-04-24 ASSESSMENT — TONOMETRY
OS_IOP_MMHG: 12
OD_IOP_MMHG: 12

## 2023-04-24 ASSESSMENT — REFRACTION_AUTOREFRACTION
OD_CYLINDER: -0.75
OD_SPHERE: +0.75
OS_SPHERE: PLANO
OD_AXIS: 104

## 2023-04-24 ASSESSMENT — CONFRONTATIONAL VISUAL FIELD TEST (CVF)
OS_FINDINGS: FULL
OD_FINDINGS: FULL

## 2023-04-24 ASSESSMENT — SPHEQUIV_DERIVED: OD_SPHEQUIV: 0.375

## 2023-04-24 ASSESSMENT — TEAR BREAK UP TIME (TBUT)
OD_TBUT: 1+
OS_TBUT: 1+

## 2023-04-24 ASSESSMENT — REFRACTION_CURRENTRX
OD_OVR_VA: 20/
OS_OVR_VA: 20/

## 2023-06-23 ENCOUNTER — OFFICE (OUTPATIENT)
Dept: URBAN - METROPOLITAN AREA CLINIC 104 | Facility: CLINIC | Age: 78
Setting detail: OPHTHALMOLOGY
End: 2023-06-23
Payer: MEDICARE

## 2023-06-23 ENCOUNTER — RX ONLY (RX ONLY)
Age: 78
End: 2023-06-23

## 2023-06-23 DIAGNOSIS — H43.813: ICD-10-CM

## 2023-06-23 DIAGNOSIS — H04.122: ICD-10-CM

## 2023-06-23 DIAGNOSIS — Z96.1: ICD-10-CM

## 2023-06-23 DIAGNOSIS — E11.9: ICD-10-CM

## 2023-06-23 DIAGNOSIS — H04.121: ICD-10-CM

## 2023-06-23 DIAGNOSIS — H01.001: ICD-10-CM

## 2023-06-23 DIAGNOSIS — H16.223: ICD-10-CM

## 2023-06-23 DIAGNOSIS — H01.004: ICD-10-CM

## 2023-06-23 DIAGNOSIS — H35.3131: ICD-10-CM

## 2023-06-23 DIAGNOSIS — H04.123: ICD-10-CM

## 2023-06-23 PROCEDURE — 99213 OFFICE O/P EST LOW 20 MIN: CPT | Performed by: OPHTHALMOLOGY

## 2023-06-23 PROCEDURE — 83861 MICROFLUID ANALY TEARS: CPT | Performed by: OPHTHALMOLOGY

## 2023-06-23 ASSESSMENT — TONOMETRY
OS_IOP_MMHG: 13
OD_IOP_MMHG: 13

## 2023-06-23 ASSESSMENT — TEAR BREAK UP TIME (TBUT)
OS_TBUT: 1+
OD_TBUT: 1+

## 2023-06-23 ASSESSMENT — SUPERFICIAL PUNCTATE KERATITIS (SPK)
OD_SPK: 1+ 2+
OS_SPK: 1+ 2+

## 2023-06-23 ASSESSMENT — VISUAL ACUITY
OS_BCVA: 20/20
OD_BCVA: 20/20

## 2023-06-23 ASSESSMENT — CONFRONTATIONAL VISUAL FIELD TEST (CVF)
OD_FINDINGS: FULL
OS_FINDINGS: FULL

## 2023-06-23 ASSESSMENT — LID EXAM ASSESSMENTS
OD_BLEPHARITIS: RUL 1+
OS_BLEPHARITIS: LUL T

## 2023-06-23 ASSESSMENT — DECREASING TEAR LAKE - SEVERITY SCORE
OD_DEC_TEARLAKE: 1+
OS_DEC_TEARLAKE: 1+

## 2023-12-08 ENCOUNTER — OFFICE (OUTPATIENT)
Dept: URBAN - METROPOLITAN AREA CLINIC 104 | Facility: CLINIC | Age: 78
Setting detail: OPHTHALMOLOGY
End: 2023-12-08
Payer: MEDICARE

## 2023-12-08 DIAGNOSIS — H01.001: ICD-10-CM

## 2023-12-08 DIAGNOSIS — H04.123: ICD-10-CM

## 2023-12-08 DIAGNOSIS — H04.122: ICD-10-CM

## 2023-12-08 DIAGNOSIS — H04.121: ICD-10-CM

## 2023-12-08 DIAGNOSIS — Z96.1: ICD-10-CM

## 2023-12-08 DIAGNOSIS — H01.004: ICD-10-CM

## 2023-12-08 PROCEDURE — 92014 COMPRE OPH EXAM EST PT 1/>: CPT | Performed by: OPHTHALMOLOGY

## 2023-12-08 ASSESSMENT — DECREASING TEAR LAKE - SEVERITY SCORE
OS_DEC_TEARLAKE: 1+
OD_DEC_TEARLAKE: 1+

## 2023-12-08 ASSESSMENT — SPHEQUIV_DERIVED
OS_SPHEQUIV: 0.25
OD_SPHEQUIV: 0.25

## 2023-12-08 ASSESSMENT — REFRACTION_AUTOREFRACTION
OD_CYLINDER: -1.00
OD_SPHERE: +0.75
OS_AXIS: 091
OS_CYLINDER: -0.50
OD_AXIS: 103
OS_SPHERE: +0.50

## 2023-12-08 ASSESSMENT — TEAR BREAK UP TIME (TBUT)
OS_TBUT: 1+
OD_TBUT: 1+

## 2023-12-08 ASSESSMENT — SUPERFICIAL PUNCTATE KERATITIS (SPK)
OD_SPK: 1+ 2+
OS_SPK: 1+ 2+

## 2023-12-08 ASSESSMENT — LID EXAM ASSESSMENTS
OS_BLEPHARITIS: LUL T
OD_BLEPHARITIS: RUL 1+

## 2024-05-24 ENCOUNTER — OFFICE (OUTPATIENT)
Dept: URBAN - METROPOLITAN AREA CLINIC 104 | Facility: CLINIC | Age: 79
Setting detail: OPHTHALMOLOGY
End: 2024-05-24
Payer: MEDICARE

## 2024-05-24 DIAGNOSIS — H01.004: ICD-10-CM

## 2024-05-24 DIAGNOSIS — H43.813: ICD-10-CM

## 2024-05-24 DIAGNOSIS — E11.9: ICD-10-CM

## 2024-05-24 DIAGNOSIS — H16.223: ICD-10-CM

## 2024-05-24 DIAGNOSIS — H35.3131: ICD-10-CM

## 2024-05-24 DIAGNOSIS — H04.122: ICD-10-CM

## 2024-05-24 DIAGNOSIS — Z96.1: ICD-10-CM

## 2024-05-24 DIAGNOSIS — H01.001: ICD-10-CM

## 2024-05-24 DIAGNOSIS — H04.121: ICD-10-CM

## 2024-05-24 DIAGNOSIS — H04.123: ICD-10-CM

## 2024-05-24 PROCEDURE — 92014 COMPRE OPH EXAM EST PT 1/>: CPT | Performed by: OPHTHALMOLOGY

## 2024-05-24 ASSESSMENT — LID EXAM ASSESSMENTS
OS_BLEPHARITIS: LUL T
OD_BLEPHARITIS: RUL 1+

## 2024-05-24 ASSESSMENT — CONFRONTATIONAL VISUAL FIELD TEST (CVF)
OD_FINDINGS: FULL
OS_FINDINGS: FULL

## 2024-10-18 NOTE — DISCHARGE NOTE PROVIDER - NSDCHHCONTACT_GEN_ALL_CORE_FT
Lov 07/01/2024Medication:   Requested Prescriptions     Pending Prescriptions Disp Refills    HYDROcodone-acetaminophen (LORCET PLUS) 7.5-325 MG per tablet 108 tablet 0     Sig: Take 1 tablet by mouth every 6 hours as needed for Pain for up to 26 days. Intended supply: 30 days Max Daily Amount: 120 tablets        Last Filled:      Patient Phone Number: [phone] (home)     Last appt: 7/1/2024   Next appt: Visit date not found    Last OARRS:       9/23/2024    12:38 PM   RX Monitoring   Periodic Controlled Substance Monitoring No signs of potential drug abuse or diversion identified.                   Lrf 09/23/2024 120 tab 0 refills  
Medication and Quantity requested:      HYDROcodone-acetaminophen (LORCET PLUS) 7.5-325 MG per tablet [[id number]]     Last Visit  7-1-2024    Pharmacy and phone number updated in [facility]:    [facility] #[id number] [facility] [address] - P [phone] - F [phone]          
As certified below, I, or a nurse practitioner or physician assistant working with me, had a face-to-face encounter that meets the physician face-to-face encounter requirements.

## 2024-11-22 ENCOUNTER — OFFICE (OUTPATIENT)
Dept: URBAN - METROPOLITAN AREA CLINIC 104 | Facility: CLINIC | Age: 79
Setting detail: OPHTHALMOLOGY
End: 2024-11-22
Payer: MEDICARE

## 2024-11-22 DIAGNOSIS — H01.001: ICD-10-CM

## 2024-11-22 DIAGNOSIS — E11.9: ICD-10-CM

## 2024-11-22 DIAGNOSIS — Z96.1: ICD-10-CM

## 2024-11-22 DIAGNOSIS — H31.001: ICD-10-CM

## 2024-11-22 DIAGNOSIS — H16.223: ICD-10-CM

## 2024-11-22 DIAGNOSIS — H01.004: ICD-10-CM

## 2024-11-22 DIAGNOSIS — H35.3131: ICD-10-CM

## 2024-11-22 DIAGNOSIS — H04.121: ICD-10-CM

## 2024-11-22 DIAGNOSIS — H43.813: ICD-10-CM

## 2024-11-22 DIAGNOSIS — H04.122: ICD-10-CM

## 2024-11-22 DIAGNOSIS — D31.32: ICD-10-CM

## 2024-11-22 DIAGNOSIS — H04.123: ICD-10-CM

## 2024-11-22 PROCEDURE — 92250 FUNDUS PHOTOGRAPHY W/I&R: CPT | Performed by: OPHTHALMOLOGY

## 2024-11-22 PROCEDURE — 99213 OFFICE O/P EST LOW 20 MIN: CPT | Performed by: OPHTHALMOLOGY

## 2024-11-22 ASSESSMENT — LID EXAM ASSESSMENTS
OS_BLEPHARITIS: LUL T
OD_BLEPHARITIS: RUL 1+

## 2024-11-22 ASSESSMENT — KERATOMETRY
OD_K1POWER_DIOPTERS: 43.00
OD_K2POWER_DIOPTERS: 43.25
OS_AXISANGLE_DEGREES: 083
OS_K2POWER_DIOPTERS: 43.25
OS_K1POWER_DIOPTERS: 43.00
OD_AXISANGLE_DEGREES: 073

## 2024-11-22 ASSESSMENT — REFRACTION_CURRENTRX
OS_SPHERE: +2.50
OS_OVR_VA: 20/
OD_OVR_VA: 20/
OD_SPHERE: +2.50

## 2024-11-22 ASSESSMENT — REFRACTION_AUTOREFRACTION
OS_AXIS: 065
OD_CYLINDER: -0.25
OD_AXIS: 096
OS_SPHERE: +0.50
OS_CYLINDER: -0.25
OD_SPHERE: +0.50

## 2024-11-22 ASSESSMENT — DECREASING TEAR LAKE - SEVERITY SCORE
OD_DEC_TEARLAKE: 1+
OS_DEC_TEARLAKE: 1+

## 2024-11-22 ASSESSMENT — TONOMETRY
OS_IOP_MMHG: 11
OD_IOP_MMHG: 11

## 2024-11-22 ASSESSMENT — SUPERFICIAL PUNCTATE KERATITIS (SPK)
OD_SPK: T
OS_SPK: 1+

## 2024-11-22 ASSESSMENT — REFRACTION_MANIFEST
OD_SPHERE: PLANO
OS_SPHERE: PLANO
OS_VA1: 20/20
OD_VA1: 20/20

## 2024-11-22 ASSESSMENT — CONFRONTATIONAL VISUAL FIELD TEST (CVF)
OS_FINDINGS: FULL
OD_FINDINGS: FULL

## 2024-11-22 ASSESSMENT — VISUAL ACUITY
OD_BCVA: 20/20
OS_BCVA: 20/20

## 2024-11-22 ASSESSMENT — TEAR BREAK UP TIME (TBUT)
OD_TBUT: 1+
OS_TBUT: 1+

## 2025-05-23 ENCOUNTER — OFFICE (OUTPATIENT)
Dept: URBAN - METROPOLITAN AREA CLINIC 104 | Facility: CLINIC | Age: 80
Setting detail: OPHTHALMOLOGY
End: 2025-05-23
Payer: MEDICARE

## 2025-05-23 DIAGNOSIS — H01.004: ICD-10-CM

## 2025-05-23 DIAGNOSIS — D31.32: ICD-10-CM

## 2025-05-23 DIAGNOSIS — H35.3131: ICD-10-CM

## 2025-05-23 DIAGNOSIS — H10.45: ICD-10-CM

## 2025-05-23 DIAGNOSIS — H04.121: ICD-10-CM

## 2025-05-23 DIAGNOSIS — H04.122: ICD-10-CM

## 2025-05-23 DIAGNOSIS — H31.001: ICD-10-CM

## 2025-05-23 DIAGNOSIS — H43.813: ICD-10-CM

## 2025-05-23 DIAGNOSIS — H04.123: ICD-10-CM

## 2025-05-23 DIAGNOSIS — E11.9: ICD-10-CM

## 2025-05-23 DIAGNOSIS — Z96.1: ICD-10-CM

## 2025-05-23 DIAGNOSIS — H01.001: ICD-10-CM

## 2025-05-23 PROCEDURE — 92134 CPTRZ OPH DX IMG PST SGM RTA: CPT | Performed by: OPHTHALMOLOGY

## 2025-05-23 PROCEDURE — 92012 INTRM OPH EXAM EST PATIENT: CPT | Performed by: OPHTHALMOLOGY

## 2025-05-23 ASSESSMENT — REFRACTION_AUTOREFRACTION
OS_SPHERE: +0.25
OD_CYLINDER: -1.25
OD_AXIS: 092
OS_AXIS: 079
OD_SPHERE: +1.00
OS_CYLINDER: -0.50

## 2025-05-23 ASSESSMENT — CONFRONTATIONAL VISUAL FIELD TEST (CVF)
OS_FINDINGS: FULL
OD_FINDINGS: FULL

## 2025-05-23 ASSESSMENT — KERATOMETRY
OS_AXISANGLE_DEGREES: 074
OD_K2POWER_DIOPTERS: 43.55
OS_K1POWER_DIOPTERS: 43.05
OS_K2POWER_DIOPTERS: 43.32
OD_K1POWER_DIOPTERS: 43.05
OD_AXISANGLE_DEGREES: 012

## 2025-05-23 ASSESSMENT — SUPERFICIAL PUNCTATE KERATITIS (SPK)
OD_SPK: T
OS_SPK: 1+

## 2025-05-23 ASSESSMENT — TEAR BREAK UP TIME (TBUT)
OD_TBUT: 1+
OS_TBUT: 1+

## 2025-05-23 ASSESSMENT — REFRACTION_MANIFEST
OD_SPHERE: PLANO
OD_VA1: 20/20
OS_VA1: 20/20
OS_SPHERE: PLANO

## 2025-05-23 ASSESSMENT — VISUAL ACUITY
OD_BCVA: 20/25
OS_BCVA: 20/20

## 2025-05-23 ASSESSMENT — LID EXAM ASSESSMENTS
OD_BLEPHARITIS: RUL 1+
OS_BLEPHARITIS: LUL T

## 2025-05-23 ASSESSMENT — TONOMETRY
OS_IOP_MMHG: 16
OD_IOP_MMHG: 16

## 2025-05-23 ASSESSMENT — REFRACTION_CURRENTRX
OS_SPHERE: +2.50
OD_SPHERE: +2.50
OS_OVR_VA: 20/
OD_OVR_VA: 20/

## 2025-05-23 ASSESSMENT — DECREASING TEAR LAKE - SEVERITY SCORE
OD_DEC_TEARLAKE: 1+
OS_DEC_TEARLAKE: 1+

## 2025-08-13 ENCOUNTER — RX ONLY (RX ONLY)
Age: 80
End: 2025-08-13

## 2025-08-13 ENCOUNTER — OFFICE (OUTPATIENT)
Dept: URBAN - METROPOLITAN AREA CLINIC 104 | Facility: CLINIC | Age: 80
Setting detail: OPHTHALMOLOGY
End: 2025-08-13
Payer: MEDICARE

## 2025-08-13 DIAGNOSIS — H10.013: ICD-10-CM

## 2025-08-13 PROBLEM — L23.89 ALLERGIC CONTACT DERMATITIS DUE TO OTHER AGENTS: Status: ACTIVE | Noted: 2025-08-13

## 2025-08-13 PROCEDURE — 99213 OFFICE O/P EST LOW 20 MIN: CPT | Performed by: OPTOMETRIST

## 2025-08-13 ASSESSMENT — REFRACTION_AUTOREFRACTION
OD_AXIS: 097
OS_CYLINDER: -0.50
OS_AXIS: 76
OS_SPHERE: 0.00
OD_SPHERE: +1.00
OD_CYLINDER: -0.75

## 2025-08-13 ASSESSMENT — REFRACTION_CURRENTRX
OS_OVR_VA: 20/
OD_OVR_VA: 20/

## 2025-08-13 ASSESSMENT — KERATOMETRY
OS_K1POWER_DIOPTERS: 43.27
OS_K2POWER_DIOPTERS: 43.55
OD_AXISANGLE_DEGREES: 010
OD_K1POWER_DIOPTERS: 42.88
OD_K2POWER_DIOPTERS: 43.60
OS_AXISANGLE_DEGREES: 095
METHOD_AUTO_MANUAL: AUTO

## 2025-08-13 ASSESSMENT — TEAR BREAK UP TIME (TBUT)
OS_TBUT: 1+
OD_TBUT: 1+

## 2025-08-13 ASSESSMENT — SUPERFICIAL PUNCTATE KERATITIS (SPK)
OD_SPK: T
OS_SPK: 1+

## 2025-08-13 ASSESSMENT — LID EXAM ASSESSMENTS
OD_BLEPHARITIS: RUL 1+
OS_BLEPHARITIS: LUL T

## 2025-08-13 ASSESSMENT — VISUAL ACUITY
OD_BCVA: 20/20-1
OS_BCVA: 20/20-1

## 2025-08-13 ASSESSMENT — CONFRONTATIONAL VISUAL FIELD TEST (CVF)
OS_FINDINGS: FULL
OD_FINDINGS: FULL

## 2025-08-13 ASSESSMENT — DECREASING TEAR LAKE - SEVERITY SCORE
OS_DEC_TEARLAKE: 1+
OD_DEC_TEARLAKE: 1+